# Patient Record
Sex: FEMALE | Race: WHITE | Employment: OTHER | URBAN - NONMETROPOLITAN AREA
[De-identification: names, ages, dates, MRNs, and addresses within clinical notes are randomized per-mention and may not be internally consistent; named-entity substitution may affect disease eponyms.]

---

## 2017-01-05 ENCOUNTER — TELEPHONE (OUTPATIENT)
Dept: OTHER | Age: 72
End: 2017-01-05

## 2017-03-09 LAB — HBA1C MFR BLD: 7.1 %

## 2017-03-13 ENCOUNTER — OFFICE VISIT (OUTPATIENT)
Dept: OTHER | Age: 72
End: 2017-03-13

## 2017-03-13 VITALS
HEIGHT: 66 IN | DIASTOLIC BLOOD PRESSURE: 72 MMHG | SYSTOLIC BLOOD PRESSURE: 132 MMHG | WEIGHT: 192.4 LBS | BODY MASS INDEX: 30.92 KG/M2

## 2017-03-13 DIAGNOSIS — E11.9 TYPE 2 DIABETES MELLITUS WITHOUT COMPLICATION, WITHOUT LONG-TERM CURRENT USE OF INSULIN (HCC): Primary | ICD-10-CM

## 2017-06-19 ENCOUNTER — OFFICE VISIT (OUTPATIENT)
Dept: OTHER | Age: 72
End: 2017-06-19

## 2017-06-19 VITALS
DIASTOLIC BLOOD PRESSURE: 66 MMHG | BODY MASS INDEX: 31.85 KG/M2 | SYSTOLIC BLOOD PRESSURE: 122 MMHG | HEIGHT: 66 IN | WEIGHT: 198.2 LBS

## 2017-06-19 DIAGNOSIS — E11.9 TYPE 2 DIABETES MELLITUS WITHOUT COMPLICATION, WITHOUT LONG-TERM CURRENT USE OF INSULIN (HCC): Primary | ICD-10-CM

## 2017-09-11 LAB
AVERAGE GLUCOSE: NORMAL
BASOPHILS ABSOLUTE: NORMAL /ΜL
BASOPHILS RELATIVE PERCENT: NORMAL %
BUN BLDV-MCNC: 15 MG/DL
CALCIUM SERPL-MCNC: 9.3 MG/DL
CHLORIDE BLD-SCNC: 104 MMOL/L
CHOLESTEROL, TOTAL: 139 MG/DL
CHOLESTEROL/HDL RATIO: NORMAL
CO2: NORMAL MMOL/L
CREAT SERPL-MCNC: 1.29 MG/DL
CREATININE, URINE: 119.2
EOSINOPHILS ABSOLUTE: NORMAL /ΜL
EOSINOPHILS RELATIVE PERCENT: NORMAL %
GFR CALCULATED: 40
GLUCOSE BLD-MCNC: 83 MG/DL
HBA1C MFR BLD: 6.9 %
HCT VFR BLD CALC: 36.6 % (ref 36–46)
HDLC SERPL-MCNC: 66 MG/DL (ref 35–70)
HEMOGLOBIN: 12.4 G/DL (ref 12–16)
LDL CHOLESTEROL CALCULATED: 75 MG/DL (ref 0–160)
LYMPHOCYTES ABSOLUTE: NORMAL /ΜL
LYMPHOCYTES RELATIVE PERCENT: NORMAL %
MCH RBC QN AUTO: NORMAL PG
MCHC RBC AUTO-ENTMCNC: NORMAL G/DL
MCV RBC AUTO: NORMAL FL
MICROALBUMIN/CREAT 24H UR: 2 MG/G{CREAT}
MICROALBUMIN/CREAT UR-RTO: 1.7
MONOCYTES ABSOLUTE: NORMAL /ΜL
MONOCYTES RELATIVE PERCENT: NORMAL %
NEUTROPHILS ABSOLUTE: NORMAL /ΜL
NEUTROPHILS RELATIVE PERCENT: NORMAL %
PLATELET # BLD: 197 K/ΜL
PMV BLD AUTO: NORMAL FL
POTASSIUM SERPL-SCNC: 3.6 MMOL/L
RBC # BLD: NORMAL 10^6/ΜL
SODIUM BLD-SCNC: 139 MMOL/L
TRIGL SERPL-MCNC: 86 MG/DL
VLDLC SERPL CALC-MCNC: NORMAL MG/DL
WBC # BLD: 7.8 10^3/ML

## 2017-10-09 ENCOUNTER — HOSPITAL ENCOUNTER (OUTPATIENT)
Dept: MAMMOGRAPHY | Age: 72
Discharge: HOME | End: 2017-10-09
Payer: MEDICARE

## 2017-10-09 DIAGNOSIS — Z12.39 BREAST CANCER SCREENING: ICD-10-CM

## 2017-10-09 PROCEDURE — G0202 SCR MAMMO BI INCL CAD: HCPCS

## 2017-10-24 ENCOUNTER — HOSPITAL ENCOUNTER (OUTPATIENT)
Dept: PHARMACY | Age: 72
Setting detail: THERAPIES SERIES
Discharge: HOME | End: 2017-10-24
Payer: MEDICARE

## 2017-10-24 VITALS
DIASTOLIC BLOOD PRESSURE: 62 MMHG | BODY MASS INDEX: 30.98 KG/M2 | WEIGHT: 192.8 LBS | HEIGHT: 66 IN | SYSTOLIC BLOOD PRESSURE: 120 MMHG

## 2017-10-24 PROCEDURE — G0108 DIAB MANAGE TRN  PER INDIV: HCPCS | Performed by: REGISTERED NURSE

## 2017-10-24 NOTE — PROGRESS NOTES
Diabetes Clinic at [address] [place], [address]  [phone] (phone)  [phone] (fax)    Patient ID: Brennon Fish 1945  Referring Provider: Dr. Jasmin Dee     Patient's name and  were verified. Subjective:    She presents for Her follow-up diabetic visit. She has type 2 diabetes mellitus. Home regimen includes: sulfonylurea  GLP-1 agonist She is compliant most of the time. Assessment:     Lab Results   Component Value Date    LABA1C 7.1 2017    BUN 15 10/31/2016    CREATININE 1.14 10/31/2016     Vitals:    10/24/17 1009   Weight: 192 lb 12.8 oz (87.5 kg)   Height: 5' 6\" (1.676 m)     Wt Readings from Last 3 Encounters:   10/24/17 192 lb 12.8 oz (87.5 kg)   17 198 lb 3.2 oz (89.9 kg)   17 192 lb 6.4 oz (87.3 kg)     Ht Readings from Last 3 Encounters:   10/24/17 5' 6\" (1.676 m)   17 5' 6\" (1.676 m)   17 5' 6\" (1.676 m)       Current monitoring regimen: home blood tests - 1-2 times daily  Home blood sugar trends: FBS's , 131, 139                                Lunch , 148. Dinner , [id number]  Any episodes of hypoglycemia? no  Previous visit with dietician:   Current diet: B 7-9am cereal   Or banana                       L 12-2p Ihop -2 egg/ 2 duque/ 2 pancakes                                    Cracker barrel- / carrots/ sourdough bread                       D4-6pm salads/ cottage cheese                      Drinks water                       Snacking --bedtime nuts  Current exercise: bike 6 miles 2 times per week; nautilus equip 50 reps. Total =1 hour. Active most days  Eye exam current (within one year): yes Dr. Benjamin Breaux and Dr. Francena Hatchet  2017  --stable.  F/u Dr. Francena Hatchet 6 mos              Last laser more than 2-3 years ago bilat  Any history of foot problems? no  Last foot exam: 10/24/17  Pedal pulses:   peripheral pulses symmetrical              Results of monofilament test: 10/10              Skin noted to be

## 2018-03-19 LAB
AVERAGE GLUCOSE: NORMAL
HBA1C MFR BLD: 7.3 %

## 2018-04-24 ENCOUNTER — OFFICE VISIT (OUTPATIENT)
Dept: INTERNAL MEDICINE CLINIC | Age: 73
End: 2018-04-24
Payer: MEDICARE

## 2018-04-24 VITALS
SYSTOLIC BLOOD PRESSURE: 114 MMHG | DIASTOLIC BLOOD PRESSURE: 64 MMHG | HEIGHT: 66 IN | BODY MASS INDEX: 31.82 KG/M2 | WEIGHT: 198 LBS

## 2018-04-24 DIAGNOSIS — E11.9 TYPE 2 DIABETES MELLITUS WITHOUT COMPLICATION, WITHOUT LONG-TERM CURRENT USE OF INSULIN (HCC): ICD-10-CM

## 2018-04-24 PROCEDURE — G0108 DIAB MANAGE TRN  PER INDIV: HCPCS | Performed by: NURSE PRACTITIONER

## 2018-04-24 PROCEDURE — 99999 PR OFFICE/OUTPT VISIT,PROCEDURE ONLY: CPT | Performed by: NURSE PRACTITIONER

## 2018-04-24 RX ORDER — SAXAGLIPTIN 5 MG/1
5 TABLET, FILM COATED ORAL DAILY
COMMUNITY
Start: 2018-03-19 | End: 2022-04-13 | Stop reason: ALTCHOICE

## 2018-09-10 LAB
BUN BLDV-MCNC: 19 MG/DL
CALCIUM SERPL-MCNC: 9.5 MG/DL
CHLORIDE BLD-SCNC: 105 MMOL/L
CO2: 26 MMOL/L
CREAT SERPL-MCNC: 1.48 MG/DL
GFR CALCULATED: 34
GLUCOSE BLD-MCNC: 137 MG/DL
POTASSIUM SERPL-SCNC: 4.4 MMOL/L
SODIUM BLD-SCNC: 142 MMOL/L

## 2018-10-11 ENCOUNTER — HOSPITAL ENCOUNTER (OUTPATIENT)
Dept: MAMMOGRAPHY | Age: 73
Discharge: HOME | End: 2018-10-11
Payer: MEDICARE

## 2018-10-11 DIAGNOSIS — Z12.39 BREAST CANCER SCREENING: ICD-10-CM

## 2018-10-11 PROCEDURE — 77067 SCR MAMMO BI INCL CAD: CPT

## 2018-11-01 ENCOUNTER — OFFICE VISIT (OUTPATIENT)
Dept: INTERNAL MEDICINE CLINIC | Age: 73
End: 2018-11-01
Payer: MEDICARE

## 2018-11-01 VITALS
WEIGHT: 200.4 LBS | BODY MASS INDEX: 32.21 KG/M2 | SYSTOLIC BLOOD PRESSURE: 102 MMHG | HEIGHT: 66 IN | DIASTOLIC BLOOD PRESSURE: 64 MMHG

## 2018-11-01 DIAGNOSIS — E11.9 TYPE 2 DIABETES MELLITUS WITHOUT COMPLICATION, WITHOUT LONG-TERM CURRENT USE OF INSULIN (HCC): ICD-10-CM

## 2018-11-01 PROCEDURE — G0108 DIAB MANAGE TRN  PER INDIV: HCPCS | Performed by: INTERNAL MEDICINE

## 2018-11-01 RX ORDER — VITAMIN B COMPLEX
2 CAPSULE ORAL DAILY
COMMUNITY

## 2018-12-13 LAB
AVERAGE GLUCOSE: 160
BUN BLDV-MCNC: 15 MG/DL
CALCIUM SERPL-MCNC: NORMAL MG/DL
CHLORIDE BLD-SCNC: 103 MMOL/L
CO2: 27 MMOL/L
CREAT SERPL-MCNC: 1.36 MG/DL
GFR CALCULATED: 38
GLUCOSE BLD-MCNC: 129 MG/DL
HBA1C MFR BLD: 7.2 %
POTASSIUM SERPL-SCNC: 3.9 MMOL/L
SODIUM BLD-SCNC: 138 MMOL/L

## 2019-03-11 ENCOUNTER — OFFICE VISIT (OUTPATIENT)
Dept: INTERNAL MEDICINE CLINIC | Age: 74
End: 2019-03-11
Payer: MEDICARE

## 2019-03-11 VITALS
DIASTOLIC BLOOD PRESSURE: 62 MMHG | BODY MASS INDEX: 32.27 KG/M2 | WEIGHT: 200.8 LBS | SYSTOLIC BLOOD PRESSURE: 130 MMHG | HEIGHT: 66 IN

## 2019-03-11 DIAGNOSIS — E11.9 TYPE 2 DIABETES MELLITUS WITHOUT COMPLICATION, WITHOUT LONG-TERM CURRENT USE OF INSULIN (HCC): ICD-10-CM

## 2019-03-11 PROCEDURE — G0108 DIAB MANAGE TRN  PER INDIV: HCPCS | Performed by: INTERNAL MEDICINE

## 2019-07-22 LAB
A/G RATIO: NORMAL
ALBUMIN SERPL-MCNC: 4 G/DL
ALP BLD-CCNC: 58 U/L
ALT SERPL-CCNC: 14 U/L
AST SERPL-CCNC: 15 U/L
AVERAGE GLUCOSE: 169
BILIRUB SERPL-MCNC: 0.4 MG/DL (ref 0.1–1.4)
BILIRUBIN DIRECT: 0.1 MG/DL
BILIRUBIN, INDIRECT: NORMAL
BUN BLDV-MCNC: 15 MG/DL
CALCIUM SERPL-MCNC: 9.3 MG/DL
CHLORIDE BLD-SCNC: 108 MMOL/L
CHOLESTEROL, TOTAL: 132 MG/DL
CHOLESTEROL/HDL RATIO: 2.3
CO2: 30 MMOL/L
CREAT SERPL-MCNC: 1.53 MG/DL
CREATININE, URINE: 83.3
GFR CALCULATED: 33
GLOBULIN: NORMAL
GLUCOSE BLD-MCNC: 61 MG/DL
HBA1C MFR BLD: 7.5 %
HDLC SERPL-MCNC: 57 MG/DL (ref 35–70)
LDL CHOLESTEROL CALCULATED: NORMAL MG/DL (ref 0–160)
MICROALBUMIN/CREAT 24H UR: 7 MG/G{CREAT}
MICROALBUMIN/CREAT UR-RTO: 8.4
POTASSIUM SERPL-SCNC: 3.9 MMOL/L
PROTEIN TOTAL: 6.9 G/DL
SODIUM BLD-SCNC: 143 MMOL/L
TRIGL SERPL-MCNC: 92 MG/DL
VLDLC SERPL CALC-MCNC: 18 MG/DL

## 2019-10-07 ENCOUNTER — OFFICE VISIT (OUTPATIENT)
Dept: INTERNAL MEDICINE CLINIC | Age: 74
End: 2019-10-07
Payer: MEDICARE

## 2019-10-07 VITALS
WEIGHT: 192.5 LBS | HEART RATE: 78 BPM | DIASTOLIC BLOOD PRESSURE: 73 MMHG | SYSTOLIC BLOOD PRESSURE: 127 MMHG | BODY MASS INDEX: 30.94 KG/M2 | HEIGHT: 66 IN

## 2019-10-07 DIAGNOSIS — E11.9 TYPE 2 DIABETES MELLITUS WITHOUT COMPLICATION, WITHOUT LONG-TERM CURRENT USE OF INSULIN (HCC): ICD-10-CM

## 2019-10-07 PROCEDURE — G0108 DIAB MANAGE TRN  PER INDIV: HCPCS | Performed by: INTERNAL MEDICINE

## 2019-10-28 ENCOUNTER — HOSPITAL ENCOUNTER (OUTPATIENT)
Dept: MAMMOGRAPHY | Age: 74
Discharge: HOME | End: 2019-10-28
Payer: MEDICARE

## 2019-10-28 DIAGNOSIS — Z12.39 BREAST CANCER SCREENING: ICD-10-CM

## 2019-10-28 PROCEDURE — 77063 BREAST TOMOSYNTHESIS BI: CPT

## 2019-10-28 PROCEDURE — 77067 SCR MAMMO BI INCL CAD: CPT

## 2020-04-24 LAB
A/G RATIO: NORMAL
ALBUMIN SERPL-MCNC: 4 G/DL
ALP BLD-CCNC: 55 U/L
ALT SERPL-CCNC: 10 U/L
AST SERPL-CCNC: 12 U/L
AVERAGE GLUCOSE: 137
BILIRUB SERPL-MCNC: 0.4 MG/DL (ref 0.1–1.4)
BILIRUBIN DIRECT: 0.1 MG/DL
BILIRUBIN, INDIRECT: NORMAL
BUN BLDV-MCNC: 14 MG/DL
CALCIUM SERPL-MCNC: 9.5 MG/DL
CHLORIDE BLD-SCNC: 104 MMOL/L
CHOLESTEROL, TOTAL: 116 MG/DL
CHOLESTEROL/HDL RATIO: 2.1
CO2: 30 MMOL/L
CREAT SERPL-MCNC: 1.31 MG/DL
CREATININE, URINE: 373.8
GFR CALCULATED: 39
GLOBULIN: NORMAL
GLUCOSE BLD-MCNC: 65 MG/DL
HBA1C MFR BLD: 6.4 %
HDLC SERPL-MCNC: 55 MG/DL (ref 35–70)
LDL CHOLESTEROL CALCULATED: NORMAL
MICROALBUMIN/CREAT 24H UR: 172.9 MG/G{CREAT}
MICROALBUMIN/CREAT UR-RTO: 46.2
NONHDLC SERPL-MCNC: NORMAL MG/DL
POTASSIUM SERPL-SCNC: 3.5 MMOL/L
PROTEIN TOTAL: 6.7 G/DL
SODIUM BLD-SCNC: 141 MMOL/L
TRIGL SERPL-MCNC: 106 MG/DL
VLDLC SERPL CALC-MCNC: 21 MG/DL

## 2020-07-06 ENCOUNTER — OFFICE VISIT (OUTPATIENT)
Dept: INTERNAL MEDICINE CLINIC | Age: 75
End: 2020-07-06
Payer: MEDICARE

## 2020-07-06 VITALS — HEIGHT: 66 IN | BODY MASS INDEX: 27.45 KG/M2 | WEIGHT: 170.8 LBS | TEMPERATURE: 97.8 F

## 2020-07-06 PROCEDURE — 97802 MEDICAL NUTRITION INDIV IN: CPT | Performed by: DIETITIAN, REGISTERED

## 2020-07-06 NOTE — PROGRESS NOTES
[address] [place] [address]. [address] [place] [address]., [place] [facility], [facility]  [phone] (phone)  [phone] (fax)    Patient Name: Felix Mann. Date of Birth: [id number]. MRN: [id number]      Assessment: Patient is a 66 y.o. female seen for Initial MNT visit for Type 2 DB (pt also with vomiting and swallowing issues). -Nutritionally relevant labs:   Lab Results   Component Value Date/Time    LABA1C 7.5 07/22/2019    LABA1C 7.2 12/13/2018    LABA1C 7.3 03/19/2018    GLUCOSE 61 07/22/2019    GLUCOSE 129 12/13/2018    CHOL 132 07/22/2019    HDL 57 07/22/2019    LDLCALC 75 09/11/2017    TRIG 92 07/22/2019     -Blood sugar trends: Checks FBS. Unable to download meter - voice meter. FBS:  84 - 189, highest reading 215  Denies low BS episodes - she treats with chocolate candy. Pt has difficulty swallowing and has suffered from vomiting since March of this year. She stated she has always had some difficulty with swallowing and episodes of vomiting, but her vomiting has become quite severe since March and attributed to unintentional weight loss of ~ 35 - 40#. Was eating baby food for awhile but now soft foods. Pt likes salads and fresh apple slices but cannot eat this now. Pt following with Brayan Baker CNP for her GI issues. She states she has achalasia. (Internet search - A rare disorder making it difficult for food and liquid to pass into the stomach. Achalasia results from damage to nerves in the food tube (esophagus), preventing the esophagus from squeezing food into the stomach. It may be caused by an abnormal immune system response. Symptoms include a backflow of food in the throat (regurgitation), chest pain, and weight loss). She had a procedure on her esophagus valve and stretched. GI primary care provider may do botox every 3 months,    -Food recall:   Breakfast: yogurt.    Lunch: skips OR no fresh fruit or salads Fazolli - pasta   Tolerates mac and cheese and pasta. \"Cracker Barrel - grilled chicken tenders kids meal - with carrots and with sourdough bread (1-2 x/week.)  Dinner: Navjot Lulu - pasta. Snacks: denies snacking -  except Dried peas (white cheddar)  22 dried peas = 1 serving. Cannot eat nuts and she use to eat them. Other foods tolerated - Soup, Mac and cheese and mashed potatoes (use to like the skin but now cannot eat this part)    -Main Beverages: water, coffee (iced), occ juices. unsw iced tea. No pop - gave up for 2 years ago. Esdras chicken meal - did not tolerate the chicken of the meal.  Likes chips - does not bother her. Last night - wheat thins  Likes sweets - weakness. 4 eldon kisses    Pt travels a lot and eats out and does not cook. She will not heat up food on the stove. She sometimes uses the microwave. She does not eat frozen meals. Pt relies on eating out or going to family and friends houses and eats with them what they make. Goes to [employer] and often takes bus trips. Pt verified that she does the Current exercise: 1 -1.5 hours ; 2 days per week                    6 miles on bike. 40lb arm weights. Leg resistance 40 reps. Arm lifts 10 reps. On days she does not work out she tries to take walks. -Impression of Dietary Intake: all meals are eaten out and convenient microwave or no cooking needed food choices when at home. frequent high fat food choices. Current Outpatient Medications on File Prior to Visit   Medication Sig Dispense Refill    CINNAMON PO Take by mouth      b complex vitamins capsule Take 1 capsule by mouth daily      ONGLYZA 5 MG TABS tablet Take 5 mg by mouth daily      losartan (COZAAR) 25 MG tablet Take by mouth daily       glyBURIDE (DIABETA) 5 MG tablet 5 mg 2 times daily (with meals) 1 tab in am & 1 tab in the evening.  calcium carbonate (OSCAL) 500 MG TABS tablet Take 500 mg by mouth daily. Please verify dosage.       MULTIPLE VITAMIN PO Take 1 tablet by mouth daily.  aspirin 81 MG tablet Take 81 mg by mouth every other day       levothyroxine (SYNTHROID) 25 MCG tablet Take 25 mcg by mouth Daily Take 1 1/2 tablets daily      simvastatin (ZOCOR) 40 MG tablet Take 40 mg by mouth nightly.  omeprazole (PRILOSEC) 20 MG delayed release capsule Take 1 capsule by mouth every morning (before breakfast) 30 capsule 6    Nutritional Supplements (GLUCERNA 1.0 AYDEE/CARBSTEADY) LIQD Take 1 Bottle by mouth 2 times daily 474 mL 6     No current facility-administered medications on file prior to visit. Vitals from current and previous visits:  Temp 97.8 °F (36.6 °C)   Ht 5' 6\" (1.676 m)   Wt 170 lb 12.8 oz (77.5 kg)   BMI 27.57 kg/m²     -Body mass index is 27.57 kg/m². 25-29.9 - Overweight. - Patient lost and 22 pounds over the last . 9  mo  -Weight goal: lose weight. Nutrition Diagnosis:   Food and nutrition-related knowledge deficit & altered GI function related to remote previous MNT/currently undergoing MNT as evidenced by New diagnosis of Achalasia and patient report of not carb counting. Intervention:  -Instructed the patient on: simplified Carbohydrate Counting, Meal Planning for Regular, Balanced Meals & Snacks and The Importance of Regular Physical Activity.  -Handouts given for: simplified carb counting handout x2, basic food logging, 150 gm sample menu, glucerna samples and coupons, soft foods list.    Patient Instructions   1.)  Brk:  Aim for 2 carb servings (=30 gms carbohydrates) + 1 protein   Lunch:  Aim for 3 carb servings (=45 gms carbohydrates) + 2 oz protein + cooked veggies  Dinner:  Aim for 3 carb servings (=45 gms carbohydrates) + 2 oz protein + cooked veggies    2.)  Most of the time 1/2 cup = 1 carb serving or 15 gms carbohydrates    3.)  Drink 1-2 oral supplement drinks/day (ex. Glucerna)    4.)  Bring a 1 week food log along with your meter to every  center appt.         -Nutrition prescription: 1400 calories/day, 135 -

## 2020-07-06 NOTE — PATIENT INSTRUCTIONS
1.)  Brk:  Aim for 2 carb servings (=30 gms carbohydrates) + 1 protein   Lunch:  Aim for 3 carb servings (=45 gms carbohydrates) + 2 oz protein + cooked veggies  Dinner:  Aim for 3 carb servings (=45 gms carbohydrates) + 2 oz protein + cooked veggies    2.)  Most of the time 1/2 cup = 1 carb serving or 15 gms carbohydrates    3.)  Drink 1-2 oral supplement drinks/day (ex. Glucerna)    4.)  Bring a 1 week food log along with your meter to every  center appt.

## 2020-07-24 ENCOUNTER — HOSPITAL ENCOUNTER (OUTPATIENT)
Age: 75
Discharge: HOME | End: 2020-07-24
Payer: MEDICARE

## 2020-07-24 PROCEDURE — U0002 COVID-19 LAB TEST NON-CDC: HCPCS

## 2020-07-26 LAB
PERFORMING LAB: NORMAL
REPORT: NORMAL
SARS-COV-2: NOT DETECTED

## 2020-07-27 ENCOUNTER — ANESTHESIA EVENT (OUTPATIENT)
Dept: ENDOSCOPY | Age: 75
End: 2020-07-27
Payer: MEDICARE

## 2020-07-27 ENCOUNTER — HOSPITAL ENCOUNTER (OUTPATIENT)
Age: 75
Setting detail: OUTPATIENT SURGERY
Discharge: HOME | End: 2020-07-27
Attending: INTERNAL MEDICINE | Admitting: INTERNAL MEDICINE
Payer: MEDICARE

## 2020-07-27 ENCOUNTER — ANESTHESIA (OUTPATIENT)
Dept: ENDOSCOPY | Age: 75
End: 2020-07-27
Payer: MEDICARE

## 2020-07-27 VITALS
HEIGHT: 66 IN | DIASTOLIC BLOOD PRESSURE: 87 MMHG | OXYGEN SATURATION: 100 % | SYSTOLIC BLOOD PRESSURE: 150 MMHG | HEART RATE: 70 BPM | RESPIRATION RATE: 16 BRPM | WEIGHT: 170.4 LBS | TEMPERATURE: 96.8 F | BODY MASS INDEX: 27.38 KG/M2

## 2020-07-27 VITALS
OXYGEN SATURATION: 100 % | DIASTOLIC BLOOD PRESSURE: 77 MMHG | SYSTOLIC BLOOD PRESSURE: 146 MMHG | RESPIRATION RATE: 15 BRPM

## 2020-07-27 PROCEDURE — 3609013800 HC EGD SUBMUCOSAL/BOTOX INJECTION: Performed by: INTERNAL MEDICINE

## 2020-07-27 PROCEDURE — 7100000001 HC PACU RECOVERY - ADDTL 15 MIN: Performed by: INTERNAL MEDICINE

## 2020-07-27 PROCEDURE — 2580000003 HC RX 258: Performed by: INTERNAL MEDICINE

## 2020-07-27 PROCEDURE — 6360000002 HC RX W HCPCS: Performed by: INTERNAL MEDICINE

## 2020-07-27 PROCEDURE — 2709999900 HC NON-CHARGEABLE SUPPLY: Performed by: INTERNAL MEDICINE

## 2020-07-27 PROCEDURE — 3700000000 HC ANESTHESIA ATTENDED CARE: Performed by: INTERNAL MEDICINE

## 2020-07-27 PROCEDURE — 6360000002 HC RX W HCPCS: Performed by: NURSE ANESTHETIST, CERTIFIED REGISTERED

## 2020-07-27 PROCEDURE — 7100000000 HC PACU RECOVERY - FIRST 15 MIN: Performed by: INTERNAL MEDICINE

## 2020-07-27 PROCEDURE — 3700000001 HC ADD 15 MINUTES (ANESTHESIA): Performed by: INTERNAL MEDICINE

## 2020-07-27 PROCEDURE — 2500000003 HC RX 250 WO HCPCS: Performed by: NURSE ANESTHETIST, CERTIFIED REGISTERED

## 2020-07-27 RX ORDER — SODIUM CHLORIDE 0.9 % (FLUSH) 0.9 %
10 SYRINGE (ML) INJECTION EVERY 12 HOURS SCHEDULED
Status: DISCONTINUED | OUTPATIENT
Start: 2020-07-27 | End: 2020-07-27 | Stop reason: HOSPADM

## 2020-07-27 RX ORDER — PROPOFOL 10 MG/ML
INJECTION, EMULSION INTRAVENOUS PRN
Status: DISCONTINUED | OUTPATIENT
Start: 2020-07-27 | End: 2020-07-27 | Stop reason: SDUPTHER

## 2020-07-27 RX ORDER — SODIUM CHLORIDE 450 MG/100ML
INJECTION, SOLUTION INTRAVENOUS CONTINUOUS
Status: DISCONTINUED | OUTPATIENT
Start: 2020-07-27 | End: 2020-07-27 | Stop reason: HOSPADM

## 2020-07-27 RX ORDER — SODIUM CHLORIDE 0.9 % (FLUSH) 0.9 %
10 SYRINGE (ML) INJECTION PRN
Status: DISCONTINUED | OUTPATIENT
Start: 2020-07-27 | End: 2020-07-27 | Stop reason: HOSPADM

## 2020-07-27 RX ORDER — LIDOCAINE HYDROCHLORIDE 20 MG/ML
INJECTION, SOLUTION EPIDURAL; INFILTRATION; INTRACAUDAL; PERINEURAL PRN
Status: DISCONTINUED | OUTPATIENT
Start: 2020-07-27 | End: 2020-07-27 | Stop reason: SDUPTHER

## 2020-07-27 RX ADMIN — PROPOFOL 50 MG: 10 INJECTION, EMULSION INTRAVENOUS at 09:31

## 2020-07-27 RX ADMIN — ONABOTULINUMTOXINA 100 UNITS: 100 INJECTION, POWDER, LYOPHILIZED, FOR SOLUTION INTRADERMAL; INTRAMUSCULAR at 09:41

## 2020-07-27 RX ADMIN — LIDOCAINE HYDROCHLORIDE 80 MG: 20 INJECTION, SOLUTION EPIDURAL; INFILTRATION; INTRACAUDAL; PERINEURAL at 09:30

## 2020-07-27 RX ADMIN — SODIUM CHLORIDE: 4.5 INJECTION, SOLUTION INTRAVENOUS at 09:15

## 2020-07-27 ASSESSMENT — PAIN - FUNCTIONAL ASSESSMENT: PAIN_FUNCTIONAL_ASSESSMENT: 0-10

## 2020-07-27 ASSESSMENT — PAIN SCALES - GENERAL
PAINLEVEL_OUTOF10: 0
PAINLEVEL_OUTOF10: 0

## 2020-07-27 NOTE — ANESTHESIA PRE PROCEDURE
Department of Anesthesiology  Preprocedure Note       Name:  Georgette Luis   Age:  66 y.o.  :  1945                                          MRN:  [id number]         Date:  2020      Surgeon: Mauricio Wilkes):  Zev Lofton MD    Procedure: Procedure(s):  EGD ESOPHAGOGASTRODUODENOSCOPY DILATATION    Medications prior to admission:   Prior to Admission medications    Medication Sig Start Date End Date Taking? Authorizing Provider   CINNAMON PO Take by mouth   Yes Historical Provider, MD   ONGLYZA 5 MG TABS tablet Take 5 mg by mouth daily 3/19/18  Yes Historical Provider, MD   losartan (COZAAR) 25 MG tablet Take by mouth daily  16  Yes Historical Provider, MD   glyBURIDE (DIABETA) 5 MG tablet 5 mg 2 times daily (with meals) 1 tab in am & 1 tab in the evening. 16  Yes Historical Provider, MD   calcium carbonate (OSCAL) 500 MG TABS tablet Take 500 mg by mouth daily. Please verify dosage. Yes Historical Provider, MD   MULTIPLE VITAMIN PO Take 1 tablet by mouth daily. Yes Historical Provider, MD   aspirin 81 MG tablet Take 81 mg by mouth every other day    Yes Historical Provider, MD   levothyroxine (SYNTHROID) 25 MCG tablet Take 25 mcg by mouth Daily Take 1 1/2 tablets daily   Yes Historical Provider, MD   simvastatin (ZOCOR) 40 MG tablet Take 40 mg by mouth nightly.    Yes Historical Provider, MD   omeprazole (PRILOSEC) 20 MG delayed release capsule Take 1 capsule by mouth every morning (before breakfast) 6/3/20 7/15/20  PIPER Buckley CNP   Nutritional Supplements (GLUCERNA 1.0 AYDEE/CARBSTEADY) LIQD Take 1 Bottle by mouth 2 times daily 6/3/20 7/15/20  PIPER Buckley CNP   b complex vitamins capsule Take 1 capsule by mouth daily    Historical Provider, MD       Current medications:    Current Facility-Administered Medications   Medication Dose Route Frequency Provider Last Rate Last Dose    sodium chloride flush 0.9 % injection 10 mL  10 mL Intravenous 2 times per day Zev Lofton MD 07/15/20 171 lb 12.8 oz (77.9 kg)   07/06/20 170 lb 12.8 oz (77.5 kg)     Body mass index is 27.5 kg/m². CBC:   Lab Results   Component Value Date    WBC 6.2 07/09/2020    RBC 3.67 07/09/2020    HGB 11.3 07/09/2020    HCT 33.7 07/09/2020    MCV 91.7 07/09/2020    RDW 13.7 07/09/2020     07/09/2020       CMP:   Lab Results   Component Value Date     07/09/2020    K 4.0 07/09/2020     07/09/2020    CO2 30 07/09/2020    BUN 15 07/09/2020    CREATININE 1.22 07/09/2020    AGRATIO 1.2 07/09/2020    LABGLOM 33 07/22/2019    GLUCOSE 61 07/09/2020    PROT 6.7 07/09/2020    PROT 6.9 07/22/2019    CALCIUM 9.50 07/09/2020    BILITOT 0.4 07/09/2020    ALKPHOS 54 07/09/2020    AST 12 07/09/2020    ALT 9 07/09/2020       POC Tests: No results for input(s): POCGLU, POCNA, POCK, POCCL, POCBUN, POCHEMO, POCHCT in the last 72 hours. Coags: No results found for: PROTIME, INR, APTT    HCG (If Applicable): No results found for: PREGTESTUR, PREGSERUM, HCG, HCGQUANT     ABGs: No results found for: PHART, PO2ART, FYW0RSN, GBS5JES, BEART, T2BATOEW     Type & Screen (If Applicable):  No results found for: LABABO, LABRH    Drug/Infectious Status (If Applicable):  No results found for: HIV, HEPCAB    COVID-19 Screening (If Applicable):   Lab Results   Component Value Date    COVID19 NOT DETECTED 07/24/2020         Anesthesia Evaluation  Patient summary reviewed and Nursing notes reviewed  Airway: Mallampati: III     Neck ROM: full   Dental: normal exam         Pulmonary:Negative Pulmonary ROS and normal exam  breath sounds clear to auscultation                             Cardiovascular:    (+) hypertension:,         Rhythm: regular  Rate: normal                    Neuro/Psych:   Negative Neuro/Psych ROS              GI/Hepatic/Renal:   (+) GERD:,           Endo/Other:    (+) DiabetesType II DM, , hypothyroidism::., .                 Abdominal:   (+) obese,     Abdomen: soft.     Vascular: negative vascular ROS.                                       Anesthesia Plan      MAC     ASA 3       Induction: intravenous. Anesthetic plan and risks discussed with patient. Plan discussed with CRNA and attending.                 PIPER Medina - CRNA   7/27/2020

## 2020-07-27 NOTE — ANESTHESIA POSTPROCEDURE EVALUATION
Department of Anesthesiology  Postprocedure Note    Patient: Diane Huerta  MRN: [id number]  YOB: 1945  Date of evaluation: 7/27/2020  Time:  10:05 AM     Procedure Summary     Date:  07/27/20 Room / Location:  [address] [place] [address] / [address] [place] [address]    Anesthesia Start:  5239 Anesthesia Stop:  7128    Procedure:  EGD SUBMUCOSAL/BOTOX INJECTION (Left Esophagus) Diagnosis:  (ACHALASIA OF ESOPHAGUS, INTERMITTENT DYSPHAGIA, POSTPRANDIAL VOMITING)    Surgeon:  Byron Day MD Responsible Provider:  Andre Pierre MD    Anesthesia Type:  MAC ASA Status:  3          Anesthesia Type: MAC    Deven Phase I: Deven Score: 10    Deven Phase II: Deven Score: 10    Last vitals: Reviewed and per EMR flowsheets.        Anesthesia Post Evaluation    Patient location during evaluation: PACU  Patient participation: complete - patient participated  Pain score: 0  Airway patency: patent  Nausea & Vomiting: no nausea and no vomiting  Complications: no  Cardiovascular status: blood pressure returned to baseline  Respiratory status: acceptable  Hydration status: euvolemic

## 2020-07-27 NOTE — PROGRESS NOTES
Photos taken, scope used GIF  SER#[id number].  100 Units of Botox injected into gastric sites. EGD completed, Zuhair tolerated well.

## 2020-07-27 NOTE — PROGRESS NOTES
5849:  Patient to recovery post procedure. No family at bedside. 6069:  Patient tolerating oral fluids. 1008:  Dr. Arlette Dotson in to discuss results/findings, plan of care with patient. 1012:  Discharge instructions provided to patient, verbalized understanding. IV discontinued. 1017:  Patient's friend called and informed patient ready for discharge. 1025:  Discharged via wheelchair to private vehicle to be driven home by family member.

## 2020-07-27 NOTE — H&P
[address] [address]  Sedation/Analgesia History & Physical    Patient: Vidhya Garcia : 1945  Med Rec#: [id number] Acc#: [id number]   Provider Performing Procedure: Frankie Pate  Primary Care Physician: Anjum Hu MD    PRE-PROCEDURE   Full CODE [x]Yes  DNR-CCA/DNR-CC []Yes   Brief History/Pre-Procedure Diagnosis: dysphagia, achalasia           MEDICAL HISTORY  []CAD/Valve  []Liver Disease  []Lung Disease []Diabetes  []Hypertension []Renal Disease  []Additional information:       has a past medical history of Hyperlipidemia, Hypertension, Hypothyroidism, and Type II or unspecified type diabetes mellitus without mention of complication, not stated as uncontrolled. SURGICAL HISTORY   has a past surgical history that includes Hysterectomy; Upper gastrointestinal endoscopy; and Colonoscopy. Additional information:       ALLERGIES   Allergies as of 07/15/2020 - Review Complete 07/15/2020   Allergen Reaction Noted    Sulfa antibiotics  2015     Additional information:       MEDICATIONS   Coumadin Use Last 7 Days [x]No []Yes  Antiplatelet drug therapy use last 7 days  [x]No []Yes  Other anticoagulant use last 7 days  [x]No []Yes    Current Facility-Administered Medications:     sodium chloride flush 0.9 % injection 10 mL, 10 mL, Intravenous, 2 times per day, [place]ie Pate MD    sodium chloride flush 0.9 % injection 10 mL, 10 mL, Intravenous, PRN, Frankie Pate MD    0.45 % sodium chloride infusion, , Intravenous, Continuous, [place]ie Pate MD    onabotulinumtoxin A (BOTOX) injection 100 Units, 100 Units, Intramuscular, Once, Frankie Pate MD  Prior to Admission medications    Medication Sig Start Date End Date Taking?  Authorizing Provider   CINNAMON PO Take by mouth   Yes Historical Provider, MD   ONGLYZA 5 MG TABS tablet Take 5 mg by mouth daily 3/19/18  Yes Historical Provider, MD   losartan (COZAAR) 25 MG tablet Take by mouth daily  16  Yes Historical Provider, MD glyBURIDE (DIABETA) 5 MG tablet 5 mg 2 times daily (with meals) 1 tab in am & 1 tab in the evening. 1/19/16  Yes Historical Provider, MD   calcium carbonate (OSCAL) 500 MG TABS tablet Take 500 mg by mouth daily. Please verify dosage. Yes Historical Provider, MD   MULTIPLE VITAMIN PO Take 1 tablet by mouth daily. Yes Historical Provider, MD   aspirin 81 MG tablet Take 81 mg by mouth every other day    Yes Historical Provider, MD   levothyroxine (SYNTHROID) 25 MCG tablet Take 25 mcg by mouth Daily Take 1 1/2 tablets daily   Yes Historical Provider, MD   simvastatin (ZOCOR) 40 MG tablet Take 40 mg by mouth nightly. Yes Historical Provider, MD   omeprazole (PRILOSEC) 20 MG delayed release capsule Take 1 capsule by mouth every morning (before breakfast) 6/3/20 7/15/20  PIPER Rios CNP   Nutritional Supplements (GLUCERNA 1.0 AYDEE/CARBSTEADY) LIQD Take 1 Bottle by mouth 2 times daily 6/3/20 7/15/20  PIPER Rios CNP   b complex vitamins capsule Take 1 capsule by mouth daily    Historical Provider, MD     Additional information:       PHYSICAL:   Heart:  [x]Regular rate and rhythm  []Other:    Lungs:  [x]Clear    []Other:    Abdomen: [x]Soft    []Other:    Mental Status: [x]Alert & Oriented  []Other:      VITAL SIGNS   Patient Vitals for the past 24 hrs:   BP Temp Temp src Pulse Resp SpO2 Height Weight   07/27/20 0741 139/67 97.1 °F (36.2 °C) Temporal 73 16 98 % 5' 6\" (1.676 m) 170 lb 6.4 oz (77.3 kg)       PLANNED PROCEDURE   [x]EGD  []Colonoscopy []Flex Sigmoid  []ERCP []EUS   []Cystoscopy  [] CATH [] BRONCH   Consent: I have discussed with the patient and/or the patient representative the indication, alternatives, and the possible risks and/or complications of the planned procedure and the anesthesia methods. The patient and/or patient representative appear to understand and agree to proceed.   SEDATION  Planned agent:[x]Midazolam []Meperidine [x]Sublimaze []Morphine  []Diazepam []Other:     ASA Classification: Class 3 - A patient with severe systemic disease that limits activity but is not incapacitating    Airway Assessment: Mallampati Class II - (soft palate, fauces & uvula are visible)    Monitoring and Safety: The patient will be placed on a cardiac monitor and vital signs, pulse oximetry and level of consciousness will be continuously evaluated throughout the procedure. The patient will be closely monitored until recovery from the medications is complete and the patient has returned to baseline status. Respiratory therapy will be on standby during the procedure. [x]Pre-procedure diagnostic studies complete and results available. Comment:    [x]Previous sedation/anesthesia experiences assessed. Comment:    [x]The patient is an appropriate candidate to undergo the planned procedure sedation and anesthesia. (Refer to nursing sedation/analgesia documentation record)  [x]Formulation and discussion of sedation/procedure plan, risks, and expectations with patient and/or responsible adult completed. [x]Patient examined immediately prior to the procedure.  (Refer to nursing sedation/analgesia documentation record)    Zev Lofton MD   Electronically signed 7/27/2020 at 9:29 AM

## 2020-07-28 NOTE — OP NOTE
[address] [place] [address]                                OPERATIVE REPORT    PATIENT NAME: Judie Frazier                     :        1945  MED REC NO:   [id number]                           ROOM:  ACCOUNT NO:   [de-identified]                           ADMIT DATE: 2020  PROVIDER:     Bay Ernst M.D.    DATE OF PROCEDURE:  2020    INDICATIONS:  The patient with history of dysphagia and history of  achalasia, benefitted from upper endoscopy and dilation done recently;  however, she has had significant difficulty to swallow. Plan today for  EGD to evaluate with botulinum toxin injection at the distal esophagus  to treat achalasia. SURGEON:  Bay Ernst MD    ASA CLASSIFICATION:  III. Estimated blood loss in none. DESCRIPTION OF PROCEDURE:  The patient was brought to GI lab. Consent  was obtained. Risks involved with the procedure were explained to the  patient. Informed consent was obtained. The patient was monitored  during the procedure with pulse oximetry, blood pressure monitoring, and  oxygen by nasal cannula. Sedation by incremental doses of IV propofol  given by anesthesia service to achieve total IV anesthesia. For ASA  classification and medication given during the procedure, please see  Anesthesia note. ESTIMATED BLOOD LOSS:  None. Standard video upper scope GIF- Olympus advanced under direct  vision from the oral cavity up to the duodenum. Esophagus featured acid  reflux with small food seen in the distal esophagus not very hard. I was  able to suction them and clean them. Then, I had slight difficulty to  continue into the stomach, likely due to spasticity of the distal  esophagus typical of achalasia. Scope was advanced to the stomach. Retroflex examination of the cardia revealed normal cardia.   Gastric  mucosa of fundus and body appeared normal.  Antrum appeared normal. Pylorus appeared normal.  Few nonclinically important gastric polyps  seen. No polypectomy was performed. Then, we diluted 100 units of  botulinum toxin in 10 mL of sterile water. Then, injected at distal  esophagus in 10 locations using injection needle. Then, the scope was  advanced again. Inspected the site of insertion or injection. No  bleeding, no complication seen. The procedure was terminated with no  immediate complications. IMPRESSION:  1. Dysphagia due to achalasia with successful injection of 100 units of  botulinum toxin at the distal esophagus. 2.  Gastritis as well as a few nonclinically significant gastric polyps. PLAN:  Follow up with the GI clinic for evaluation. Repeat injection if  the patient does not clinically improve or repeat dilation if clinically  not improved, depends on the clinical setting. The patient is to follow  up with GI clinic for evaluation, soft diet has been recommended.         Paxton Burris M.D.    D: 07/27/2020 10:03:05       T: 07/27/2020 10:50:38     [email or web address]  Job#: [id number]     Doc#: [id number]    CC:  Catrina Cade M.D.

## 2020-09-14 LAB
AVERAGE GLUCOSE: NORMAL
HBA1C MFR BLD: 6.5 %

## 2020-10-22 ENCOUNTER — OFFICE VISIT (OUTPATIENT)
Dept: INTERNAL MEDICINE CLINIC | Age: 75
End: 2020-10-22
Payer: MEDICARE

## 2020-10-22 VITALS — WEIGHT: 192.8 LBS | HEIGHT: 66 IN | BODY MASS INDEX: 30.98 KG/M2 | TEMPERATURE: 98.2 F

## 2020-10-22 PROCEDURE — 97803 MED NUTRITION INDIV SUBSEQ: CPT | Performed by: DIETITIAN, REGISTERED

## 2020-10-22 NOTE — PATIENT INSTRUCTIONS
1. )  So Glad to hear you can eat better and no vomiting.  - Aim for no more than 4 carb servings at a meal  = Remember:  1/2 cup = 1 carb servings. 2.)  Check the label of your yogurt  Choose the yogurts with 120 calories or less. 3.)  Drink more water - 2-3 bottles a day    4.)  Check BS at other times of the day  - Before lunch or before supper, BS goal   - 2 hours after the start of a meal, BS goal:  100 - 150/160    5.)  Refer to the snack handout for lower calorie snack ideas. Bring your tablet with your Blood sugars written down to every DB center appt. Along with your meter.  Thanks

## 2020-10-22 NOTE — PROGRESS NOTES
[address] [place] [address]. [id number] [place] [address] Horace., [facility], [address]  [phone] (phone)  [phone] (fax)    Patient Name: India Bob. Date of Birth: [id number]. MRN: [id number]      Assessment: Patient is a 66 y.o. female seen for follow-up MNT visit for Type 2 DB.     -Nutritionally relevant labs:   Lab Results   Component Value Date/Time    LABA1C 7.5 07/22/2019    LABA1C 7.2 12/13/2018    LABA1C 7.3 03/19/2018    GLUCOSE 61 (L) 07/09/2020 09:13 AM    GLUCOSE 61 07/22/2019    GLUCOSE 129 12/13/2018    CHOL 132 07/22/2019    HDL 57 07/22/2019    LDLCALC 75 09/11/2017    TRIG 92 07/22/2019     Stopped Metformin by Dr Soraya Monaco several months ago. Per pt Had a AIC done ~6.5%    Prior nut. Counseling session 7/6/2020  Pt has difficulty swallowing and has suffered from vomiting since March of this year. She stated she has always had some difficulty with swallowing and episodes of vomiting, but her vomiting has become quite severe since March and attributed to unintentional weight loss of ~ 35 - 40#. Was eating baby food for awhile but now soft foods. Pt received botox treatment for her achalasia and is able to tolerate regular foods again. Pt gained weight back that she had lost.   -Blood sugar trends: Unable to retrieve BS readings from meter. Meter does not download. Per pt - FBS 64 today and then drank Ensure, baby food with quinoa with pumpkin. FBS yesterday 89  Most of the time FBS <130  Denies low episodes. Pt states she writes down her BS readings everyday.    -Food recall/food log:   Breakfast: 1 1/2 cup milk and pop tart OR 1/2 blueberry muffin & milk OR yogurt and Oral nutrition supplement (ONS) drink. OR oatmeal and coffee with cream and sometimes with peach flavored ONS.    Snack:  Peanut bar OR granola bar and iced coffee OR sf cookie wafers OR 15-20 wheat thins  Lunch: chopped sirloin, 1/4 baked potato, dinner roll and butter OR 3 grilled chicken tenders, 2 grilled sourdough bread, sweet potato casserole from [employer] OR 1 1/2 cups mac & cheese and Jello with fruit (ate at her cousin's house). Snack:  Apple and caramel OR ice cream OR diced pears OR brownie  Dinner: Tomato Basil soup and 1/2 grilled cheese OR 2 eggs, duque, potatoes, onions, cheese, 2 sl rye toast.   Snacks: corn chips or Doritos or ice cream  Pt dislikes light yogurts    -Main Beverages: Unsw iced tea, coffee. Water - 1 bottled    Pt travels a lot and eats out and does not cook. Shakira Antunez will not heat up food on the stove. She sometimes uses the microwave. She does not eat frozen meals. Pt relies on eating out or going to family and friends houses and eats with them what they make. Goes to casinos and often takes bus trips.      -Impression of Dietary Intake: on average, 3 meals per day, 2-3 snacks/day, frequent sweets intake. Current Outpatient Medications on File Prior to Visit   Medication Sig Dispense Refill    CINNAMON PO Take by mouth      b complex vitamins capsule Take 1 capsule by mouth daily      ONGLYZA 5 MG TABS tablet Take 5 mg by mouth daily      losartan (COZAAR) 25 MG tablet Take by mouth daily       glyBURIDE (DIABETA) 5 MG tablet 5 mg 2 times daily (with meals) 1 tab in am & 1 tab in the evening.  calcium carbonate (OSCAL) 500 MG TABS tablet Take 500 mg by mouth daily. Please verify dosage.  MULTIPLE VITAMIN PO Take 1 tablet by mouth daily.  levothyroxine (SYNTHROID) 25 MCG tablet Take 25 mcg by mouth Daily Take 1 1/2 tablets daily      simvastatin (ZOCOR) 40 MG tablet Take 40 mg by mouth nightly.       omeprazole (PRILOSEC) 20 MG delayed release capsule Take 1 capsule by mouth every morning (before breakfast) 90 capsule 2    Nutritional Supplements (GLUCERNA 1.0 AYDEE/CARBSTEADY) LIQD Take 1 Bottle by mouth 2 times daily 474 mL 6    aspirin 81 MG tablet Take 81 mg by mouth every other day        No current facility-administered medications on file prior to visit. Vitals from current and previous visits:  Temp 98.2 °F (36.8 °C)   Ht 5' 6\" (1.676 m)   Wt 192 lb 12.8 oz (87.5 kg)   BMI 31.12 kg/m²     -Body mass index is 31.12 kg/m². 30-34.9 - Obesity Grade I.   - Patient gained and 22 pounds over the last 3.5 mo. .  -Weight goal: lose weight. Nutrition Diagnosis:   Food and nutrition-related knowledge deficit related to currently undergoing MNT as evidenced by her food logs and Patient report of not understanding carb counting. Intervention:  -Impression: Pt is not experiencing vomiting d/t achalasia anymore since Botox. She has been eating sweets frequently and meals are sometimes too high in carbohydrates. Pt states she does not understand carb counting. Demonstrated what foods are carbohydrates using food models and demonstrated what 4 carb servings/meal would look like along with having protein and veggies    -Instructed the patient on: see instructions below. Gave glucerna and Boost glucose control samples and coupons to patient. Patient Instructions   1.)  So Glad to hear you can eat better and no vomiting.  - Aim for no more than 4 carb servings at a meal  = Remember:  1/2 cup = 1 carb servings. 2.)  Check the label of your yogurt  Choose the yogurts with 120 calories or less. 3.)  Drink more water - 2-3 bottles a day    4.)  Check BS at other times of the day  - Before lunch or before supper, BS goal   - 2 hours after the start of a meal, BS goal:  100 - 150/160    5.)  Refer to the snack handout for lower calorie snack ideas. Bring your tablet with your Blood sugars written down to every DB center appt. Along with your meter. Thanks      -Nutrition prescription: 1500 calories/day, 165 g carbs/day. Comprehension verified using teachback method.     Monitoring/Evaluation:   -Followup visit: 5 mo with dietitian.   -Receptiveness to education/goals: Agreeable.  -Evaluation of education:

## 2020-11-03 ENCOUNTER — HOSPITAL ENCOUNTER (OUTPATIENT)
Dept: MAMMOGRAPHY | Age: 75
Discharge: HOME | End: 2020-11-03
Payer: MEDICARE

## 2020-11-03 ENCOUNTER — OFFICE VISIT (OUTPATIENT)
Dept: INTERNAL MEDICINE CLINIC | Age: 75
End: 2020-11-03
Payer: MEDICARE

## 2020-11-03 VITALS
HEART RATE: 78 BPM | DIASTOLIC BLOOD PRESSURE: 64 MMHG | SYSTOLIC BLOOD PRESSURE: 120 MMHG | BODY MASS INDEX: 31.37 KG/M2 | TEMPERATURE: 97.3 F | HEIGHT: 66 IN | WEIGHT: 195.2 LBS

## 2020-11-03 PROCEDURE — 77063 BREAST TOMOSYNTHESIS BI: CPT

## 2020-11-03 PROCEDURE — G0108 DIAB MANAGE TRN  PER INDIV: HCPCS | Performed by: INTERNAL MEDICINE

## 2020-11-03 NOTE — PATIENT INSTRUCTIONS
Continue with [employer] workout routine 2-3 days per week           This keeps you moving and mobile? ?   Keep checking blood sugars once daily                --try to get your reading before supper or bedtime 2 days per week                   Goal   Remember to keep your carbohydrates at your meals and snacks consistent            too many carbs at a meal will make your blood sugars too high             Not enough carbs at your meal may result in a low blood sugar before the next time you eat

## 2021-03-04 ENCOUNTER — TELEPHONE (OUTPATIENT)
Dept: INTERNAL MEDICINE CLINIC | Age: 76
End: 2021-03-04

## 2021-03-04 NOTE — TELEPHONE ENCOUNTER
Left jennifer from outpatient dietitian office of needing to cancel her upcoming dietitian appt on Monday 3/22/21 @ 1130 am.  Callback # provided to reschedule this appt.

## 2021-03-11 ENCOUNTER — OFFICE VISIT (OUTPATIENT)
Dept: INTERNAL MEDICINE CLINIC | Age: 76
End: 2021-03-11
Payer: MEDICARE

## 2021-03-11 VITALS — HEIGHT: 66 IN | WEIGHT: 199.6 LBS | TEMPERATURE: 98 F | BODY MASS INDEX: 32.08 KG/M2

## 2021-03-11 DIAGNOSIS — E11.9 TYPE 2 DIABETES MELLITUS WITHOUT COMPLICATION, WITHOUT LONG-TERM CURRENT USE OF INSULIN (HCC): ICD-10-CM

## 2021-03-11 PROCEDURE — 97803 MED NUTRITION INDIV SUBSEQ: CPT | Performed by: DIETITIAN, REGISTERED

## 2021-03-11 NOTE — PROGRESS NOTES
[address] [place] [address]. [address] [place] [address] Horace., [facility], [facility]  [phone] (phone)  [phone] (fax)    Patient Name: Mahsa Zaman. Date of Birth: [id number]. MRN: [id number]      Assessment: Patient is a 66 y.o. female seen for follow-up MNT visit for Type 2 DB.     -Nutritionally relevant labs:   Lab Results   Component Value Date/Time    LABA1C 7.5 07/22/2019    LABA1C 7.2 12/13/2018    LABA1C 7.3 03/19/2018    GLUCOSE 61 (L) 07/09/2020 09:13 AM    GLUCOSE 61 07/22/2019    GLUCOSE 129 12/13/2018    CHOL 132 07/22/2019    HDL 57 07/22/2019    LDLCALC 75 09/11/2017    TRIG 92 07/22/2019     3/11/2021 called Dr Bruna Mcarthur office for most current office visit, [address] [place] [address] and labs. -Blood sugar trends: Only checks FBS. Written documentation provided,  Did not bring meter. Meter does not download. Ranges in the past month: 81 - 140    -Food recall/Food log:   Breakfast: Instant flavored oatmeal & 1 cup milk OR protein drink and granola bar 1 yogurt and milk OR Mush, fried egg, duque OR omelette hashbrown, toast and jelly. Lunch: [place] spread and potato salad OR orange chicken and rice OR Mac and cheese (chooses this entree bec it is cheapest.), duque and tomato juice OR taco supreme OR 3 grilled chicken tenders and carrots. Snack - boiled egg OR 10 pringles OR cheese balls OR orange  Dinner: Yogurt and protein drink OR Homemade beef stew OR chicken potatoes and carrots OR small piece of pizza OR spaghetti and meatballs, applesauce. Snacks: Gardettos OR quinoa chips OR walnuts OR 1/2 cup ice cream    Exercises - Goes to [employer] 2x/week    -Main Beverages: water or unsw tea. Sugar free flavored iced coffee. -Impression of Dietary Intake: on average, 3 meals per day, high fat/ cholesterol, eating out every day, pt does not cook. .    Current Outpatient Medications on File Prior to Visit   Medication Sig Dispense Refill    Nutritional Supplements (BOOST/FIBER PO) Take 1 Bottle by mouth as needed For meal replacement      CINNAMON PO Take by mouth      b complex vitamins capsule Take 1 capsule by mouth daily      ONGLYZA 5 MG TABS tablet Take 5 mg by mouth daily      losartan (COZAAR) 25 MG tablet Take by mouth daily       glyBURIDE (DIABETA) 5 MG tablet 5 mg 2 times daily (with meals) 1 tab in am & 1 tab in the evening.  calcium carbonate (OSCAL) 500 MG TABS tablet Take 500 mg by mouth daily. Please verify dosage.  MULTIPLE VITAMIN PO Take 1 tablet by mouth daily.  aspirin 81 MG tablet Take 81 mg by mouth every other day       levothyroxine (SYNTHROID) 25 MCG tablet Take 25 mcg by mouth Daily Take 1 1/2 tablets daily      simvastatin (ZOCOR) 40 MG tablet Take 40 mg by mouth nightly.  omeprazole (PRILOSEC) 20 MG delayed release capsule Take 1 capsule by mouth every morning (before breakfast) 90 capsule 2    Nutritional Supplements (GLUCERNA 1.0 AYDEE/CARBSTEADY) LIQD Take 1 Bottle by mouth 2 times daily 474 mL 6     No current facility-administered medications on file prior to visit. Vitals from current and previous visits:  Temp 98 °F (36.7 °C)   Ht 5' 6\" (1.676 m)   Wt 199 lb 9.6 oz (90.5 kg)   BMI 32.22 kg/m²     -Body mass index is 32.22 kg/m². 30-34.9 - Obesity Grade I.   - Patient gained and 6 pounds over the last 5 mo. .  -Weight goal: lose weight. Nutrition Diagnosis:   Self-monitoring deficit and food and nutrition knowledge deficit related to patient unwilling to bring meter with her when away from home and currently undergoing MNT as evidenced by Food recall and BS documentation of only checking BS in the morning.          Intervention:  -Impression: Pt relies heavily on eating out or going family/friend's homes for meals.    -Instructed the patient on: Meal Planning for Regular, Balanced Meals & Snacks and The Importance of Regular Physical Activity and see instructions below    -Handouts given for: Boost Glucose Control and Glucerna coupons, eat seafood twice a week, choosing water, food bank info (for her family members), roasting veggies. Patient Instructions   1.)  Next time buy lower sugar instant oatmeal.    2.)  Good to select grilled or baked items when eating out. - Limit or avoid high fat condiments, such as, sour cream, dressings.  - Add cooked vegetables with meals when available. 3.)  Eat seafood or salmon 1-2x/week. 4.)  When eating Chinese - limit rice and sweet and sour sauces or any sauces with added sugars.    - Good idea to limit pasta, rice and bread. Small portions are ok.    5.)  Check BS 1-2 x/week after a meal - check 2 hours after the start of a meal.  BS goal:  100 - 150/160    6.)  Good job going to [employer] each week go for sure 2x/week. Also get up from sitting too long and take walks. -Nutrition prescription: 1400 calories/day, 150 g carbs/day. Comprehension verified using teachback method. Monitoring/Evaluation:   -Followup visit: 9 mo with dietitian.   -Receptiveness to education/goals: Agreeable.  -Evaluation of education: Indicates understanding.  -Readiness to change: precontemplative- checking BS in the evening when home after supper, adding more vegetables at meals, limiting high carb entree choices when eating out. -Expected compliance: fair. Thank you for your referral of this patient. Total time involved in direct patient education: 30 minutes for follow-up MNT visit.

## 2021-09-13 LAB
ALBUMIN SERPL-MCNC: 4.2 G/DL
ALP BLD-CCNC: 74 U/L
ALT SERPL-CCNC: 74 U/L
ANION GAP SERPL CALCULATED.3IONS-SCNC: 10 MMOL/L
AST SERPL-CCNC: 19 U/L
AVERAGE GLUCOSE: 209
BASOPHILS ABSOLUTE: 0 /ΜL
BASOPHILS RELATIVE PERCENT: 0.7 %
BILIRUB SERPL-MCNC: 0.4 MG/DL (ref 0.1–1.4)
BUN BLDV-MCNC: 19 MG/DL
CALCIUM SERPL-MCNC: 9.6 MG/DL
CHLORIDE BLD-SCNC: 104 MMOL/L
CHOLESTEROL, TOTAL: 153 MG/DL
CHOLESTEROL/HDL RATIO: 2.5
CO2: 29 MMOL/L
CREAT SERPL-MCNC: 1.4 MG/DL
CREATININE, URINE: 79.4
EOSINOPHILS ABSOLUTE: 200 /ΜL
EOSINOPHILS RELATIVE PERCENT: 2.4 %
GFR CALCULATED: 36
GLUCOSE BLD-MCNC: 134 MG/DL
HBA1C MFR BLD: 8.9 %
HCT VFR BLD CALC: 35.2 % (ref 36–46)
HDLC SERPL-MCNC: 60 MG/DL (ref 35–70)
HEMOGLOBIN: 11.9 G/DL (ref 12–16)
LDL CHOLESTEROL CALCULATED: NORMAL
LYMPHOCYTES ABSOLUTE: 1900 /ΜL
LYMPHOCYTES RELATIVE PERCENT: 27.3 %
MCH RBC QN AUTO: 30.7 PG
MCHC RBC AUTO-ENTMCNC: 33.7 G/DL
MCV RBC AUTO: 91.2 FL
MICROALBUMIN/CREAT 24H UR: <7 MG/G{CREAT}
MICROALBUMIN/CREAT UR-RTO: <8.8
MONOCYTES ABSOLUTE: 500 /ΜL
MONOCYTES RELATIVE PERCENT: 7.6 %
NEUTROPHILS ABSOLUTE: 4300 /ΜL
NEUTROPHILS RELATIVE PERCENT: 62 %
NONHDLC SERPL-MCNC: NORMAL MG/DL
PDW BLD-RTO: 13.9 %
PLATELET # BLD: 235 K/ΜL
PMV BLD AUTO: ABNORMAL FL
POTASSIUM SERPL-SCNC: 4.3 MMOL/L
RBC # BLD: 3.86 10^6/ΜL
SODIUM BLD-SCNC: 143 MMOL/L
TOTAL PROTEIN: 7.1
TRIGL SERPL-MCNC: 139 MG/DL
VLDLC SERPL CALC-MCNC: 27 MG/DL
WBC # BLD: 6.9 10^3/ML

## 2021-10-11 ENCOUNTER — OFFICE VISIT (OUTPATIENT)
Dept: INTERNAL MEDICINE CLINIC | Age: 76
End: 2021-10-11
Payer: MEDICARE

## 2021-10-11 VITALS
WEIGHT: 203.8 LBS | HEIGHT: 66 IN | TEMPERATURE: 97.3 F | SYSTOLIC BLOOD PRESSURE: 138 MMHG | HEART RATE: 73 BPM | DIASTOLIC BLOOD PRESSURE: 67 MMHG | BODY MASS INDEX: 32.75 KG/M2

## 2021-10-11 DIAGNOSIS — E11.9 TYPE 2 DIABETES MELLITUS WITHOUT COMPLICATION, WITHOUT LONG-TERM CURRENT USE OF INSULIN (HCC): ICD-10-CM

## 2021-10-11 PROCEDURE — G0108 DIAB MANAGE TRN  PER INDIV: HCPCS | Performed by: INTERNAL MEDICINE

## 2021-10-11 RX ORDER — EMPAGLIFLOZIN 10 MG/1
20 TABLET, FILM COATED ORAL DAILY
COMMUNITY
Start: 2021-09-23 | End: 2022-09-20

## 2021-10-11 ASSESSMENT — PATIENT HEALTH QUESTIONNAIRE - PHQ9
1. LITTLE INTEREST OR PLEASURE IN DOING THINGS: NOT AT ALL
2. FEELING DOWN, DEPRESSED OR HOPELESS: NOT AT ALL
SUM OF ALL RESPONSES TO PHQ9 QUESTIONS 1 & 2: 0
DEPRESSION UNABLE TO ASSESS: YES

## 2021-10-11 NOTE — PROGRESS NOTES
[facility]  [facility]. [address] Horace., [facility], [address]  [phone] (phone)  [phone] (fax)    Patient ID: Hannah Segovia [id number]  Referring Provider: Dr. Anabelle Mcneill     Patient's name and  were verified. Subjective:    She presents for Her follow-up diabetic visit. She has type 2 diabetes mellitus. Home regimen includes: Sulfonylurea, GLP-1 Agonist and SGLT-2 inhibitors She is noncompliant some of the time. Assessment:     Lab Results   Component Value Date    LABA1C 6.5 2020    BUN 15 2020    CREATININE 1.22 2020     There were no vitals filed for this visit. Wt Readings from Last 3 Encounters:   21 203 lb (92.1 kg)   21 205 lb (93 kg)   21 199 lb 9.6 oz (90.5 kg)     Ht Readings from Last 3 Encounters:   21 5' 6\" (1.676 m)   21 5' 6\" (1.676 m)   21 5' 6\" (1.676 m)       Glucose at 1 hrs PPD today resulted at 228mg/dl  Current monitoring regimen: home blood tests - 1 times daily  Home blood sugar trends: FBS's   Any episodes of hypoglycemia? no  Depression screening completed 10/11/21 score 0  Previous visit with dietician: yes - 3/11/21  Current diet: B yogurt/ protein drink  Or coffee/ toast                      L chicken/ oriental veggies/ potato/ cheese                      D mixed nuts; drum stick                              1/2 large pretzel                              m potato/ dressing/ roast beef/ salad/ pie/                                       Green beans  Current exercise: [employer] 2 times per week. Taking a trip every 2-3 weeks with a lot of walking. Eye exam current (within one year): yes Dr. Trisha Reyes 10/4/21. Appt 22. Hx laser 5-6 yrs ago  Any history of foot problems? no  Last foot exam: 10/11/21 Pedal pulses:   peripheral pulses symmetrical   Results of monofilament test: 10/10              Skin noted to be warm, pale and hair is reduced. Great nails thick bilat.   Immunizations up to date: yes - 2021  Taking ASA:  Yes   Appropriate for use of MyChart Glucose Grid:  Yes    Focus:     Diabetes education. Recent A1C 8.9%. Ada Hathaway is not paying attention to her meal plan. She is taking many trips that involve more extensive dining/ desserts. She is taking in more carbs than recommended when she is away from home and this can be several times per month. She reports walking a lot, but only has 2 days of structured exercise. Ada Hathaway did not bring her meter today and A1C was retrieved after Ada Hathaway left. We will need to follow up with her about returning to the clinic sooner. Glucose levels are getting too high. Current follow up 2 months with KG JIMENEZ PLAN:   Discussed general issues about diabetes pathophysiology and management. Counseling at today's visit: BG goals; carbs; exercise; medication actions; timing of SGM. Set limits on your food choices          --leave out the potato, bread, dressing if you are going to have             Dessert. Think about taking dessert home for lunch the next day  Consider checking blood sugar 2 hours after breakfast on 2 days                --when you have your protein shake and another carbohydrate                          (like banana or yogurt with your protein shake)                      GOAL is under 160 (under 180 is ok)  Stay active--walk everyday and [employer] at least 2 days per week                  The more active you are--the better your  Insulin works for you! Meter download, medications, PMH and nursing assessment reviewed. Kamala Sandoval states She is willing to participate in this plan of care and verbalized understanding of all instructions provided. Teach back used to verify comprehension. Total time involved in direct patient education: 60 minutes.

## 2021-10-11 NOTE — PATIENT INSTRUCTIONS
Set limits on your food choices          --leave out the potato, bread, dressing if you are going to have             Dessert. Think about taking dessert home for lunch the next day  Consider checking blood sugar 2 hours after breakfast on 2 days                --when you have your protein shake and another carbohydrate                          (like banana or yogurt with your protein shake)                      GOAL is under 160 (under 180 is ok)  Stay active--walk everyday and [employer] at least 2 days per week                  The more active you are--the better your  Insulin works for you!

## 2021-10-14 ENCOUNTER — TELEPHONE (OUTPATIENT)
Dept: INTERNAL MEDICINE CLINIC | Age: 76
End: 2021-10-14

## 2021-10-14 NOTE — TELEPHONE ENCOUNTER
Per Norma Fagan RN, CDE, called patient and left a message asking for a return call to schedule her with a provider in our office due to her A1c going up to 8.9.

## 2021-12-16 ENCOUNTER — OFFICE VISIT (OUTPATIENT)
Dept: INTERNAL MEDICINE CLINIC | Age: 76
End: 2021-12-16

## 2021-12-16 VITALS — BODY MASS INDEX: 32.62 KG/M2 | HEIGHT: 66 IN | WEIGHT: 203 LBS | TEMPERATURE: 97.8 F

## 2021-12-16 DIAGNOSIS — E11.9 TYPE 2 DIABETES MELLITUS WITHOUT COMPLICATION, WITHOUT LONG-TERM CURRENT USE OF INSULIN (HCC): ICD-10-CM

## 2021-12-16 NOTE — PROGRESS NOTES
[address] [place] [address]. [address] [place] [address] Rd., [facility], [address]  [phone] (phone)  [phone] (fax)    Patient Name: Kathryn De La Cruz. Date of Birth: [id number]. MRN: [id number]      Assessment: Patient is a 64 y.o. female seen for follow-up MNT visit for Type 2 DB.     -Nutritionally relevant labs:   Lab Results   Component Value Date/Time    LABA1C 8.9 09/13/2021 12:00 AM    LABA1C 6.5 09/14/2020 12:00 AM    LABA1C 6.4 04/24/2020 12:00 AM    GLUCOSE 134 09/13/2021 12:00 AM    GLUCOSE 61 (L) 07/09/2020 09:13 AM    GLUCOSE 65 04/24/2020 12:00 AM    CHOL 153 09/13/2021 12:00 AM    HDL 60 09/13/2021 12:00 AM    LDLCALC 75 09/11/2017 12:00 AM    TRIG 139 09/13/2021 12:00 AM     -Blood sugar trends: Checks FBS most of the time. She stated she checked her BS once, 2 hours after a meal and   FBS:  104, 156, 157, 125, 110,133., 94, 138, 87.    -Food recall:   Breakfast: Supplement drink - whatever is on sale &/or has a coupon for. Instant oatmeal once every 1-2 weeks. Lunch or Dinner examples: Grilled Chicken Tenders, Broccoli and sourdough bread from [employer] OR Grilled [place] meal from [employer]  ([place]) - Fried chicken, mashed pot, noodles and dressing . Snacks: If she eats raw veggies she likes to dip them into pbutter. Occ she will eat a salad but states this is hard on her digestive system with her malfunction of esophageal sphincter valve. Pt mentioned she took a dessert home for later time to eat. When she has a sweet roll she only eats 1/2 of it and saves the other 1/2 for another time. -Main Beverages: unsw tea and water 2-3 bottles/day. Exercise:  Pt verified that she does the Current exercise: 1 -1.5 hours ; 1 day per week                    6 miles on bike. 40lb arm weights. Leg resistance 40 reps. Arm lifts 10 reps. On days she does not work out she tries to take walks.     -Impression of Dietary Intake: on average, 3 meals per day, low fiber, lacking routine intake of fruits and vegetables, 14x/week eating out    Current Outpatient Medications on File Prior to Visit   Medication Sig Dispense Refill    JARDIANCE 10 MG tablet Take 10 mg by mouth daily      Nutritional Supplements (BOOST/FIBER PO) Take 1 Bottle by mouth as needed For meal replacement      CINNAMON PO Take by mouth      b complex vitamins capsule Take 1 capsule by mouth daily      ONGLYZA 5 MG TABS tablet Take 5 mg by mouth daily      losartan (COZAAR) 25 MG tablet Take by mouth daily       glyBURIDE (DIABETA) 5 MG tablet 5 mg 2 times daily (with meals) 1 tab in am & 1 tab in the evening.  calcium carbonate (OSCAL) 500 MG TABS tablet Take 500 mg by mouth daily. Please verify dosage.  MULTIPLE VITAMIN PO Take 1 tablet by mouth daily.  aspirin 81 MG tablet Take 81 mg by mouth every other day       levothyroxine (SYNTHROID) 25 MCG tablet Take 25 mcg by mouth Daily Take 1 1/2 tablets daily      simvastatin (ZOCOR) 40 MG tablet Take 40 mg by mouth nightly.  omeprazole (PRILOSEC) 20 MG delayed release capsule Take 1 capsule by mouth every morning (before breakfast) 90 capsule 2    Nutritional Supplements (GLUCERNA 1.0 AYDEE/CARBSTEADY) LIQD Take 1 Bottle by mouth 2 times daily 474 mL 6     No current facility-administered medications on file prior to visit. Vitals from current and previous visits:  Temp 97.8 °F (36.6 °C)   Ht 5' 6\" (1.676 m)   Wt 203 lb (92.1 kg)   BMI 32.77 kg/m²     -Body mass index is 32.77 kg/m². 30-34.9 - Obesity Grade I.   - Patient gained and 3 pounds over the last 9 mo. .  -Weight goal: lose weight. Nutrition Diagnosis:   Food and nutrition-related knowledge deficit related to trying to learn/practice nutrition recommendations as evidenced by Patient report of making healthy menu options when eating out and limiting desserts.          Intervention:  -Impression: Pt eats all of her lunch and dinner meals outside the home. She does not cook. She travels around to play [id number] or to [employer]. -Instructed the patient on: Healthy Choices When [employer], Meal Planning for Regular, Balanced Meals & Snacks and The Importance of Regular Physical Activity Not to buy any other oral nutrition supplement drink other than Glucerna or Boost Glucose control.    -Handouts given for: food logging. Patient Instructions   Good job with your weekly workout at [employer]. - Try to go 2x/week.  -good job getting in your walking on other days of the week. Cut in 1/2 added [place] and pbutter with snacks. Good job selecting grilled or baked items when eating out. - Limit or avoid high fat condiments, such as, sour cream, dressings.  - Add cooked vegetables with meals when available - Good job selecting broccoli with meals.     Check BS 1-2 x/week after a meal - check 2 hours after the start of a meal.  BS goal:  100 - 150/160     Cut back on nuts from 1/3 cup to 1/4 cup to help with weight loss efforts. Drink adequate water - 3-4 bottles/day. Your caffeine free sugar free drinks can count toward your fluid intake too. But always good to drink water most of the time.    -Nutrition prescription: 1400 calories/day, 150 g carbs/day. Comprehension verified using teachback method. Monitoring/Evaluation:   -Followup visit: 6 mo with dietitian.   -Receptiveness to education/goals: Agreeable.  -Evaluation of education: Indicates understanding.  -Readiness to change: precontemplative- going to [employer] 2x/week, drinking more water, cutting back on added fats and nuts to help with weight loss efforts.  -Expected compliance: fair. Thank you for your referral of this patient. Total time involved in direct patient education: 30 minutes for follow-up MNT visit.

## 2021-12-20 ENCOUNTER — HOSPITAL ENCOUNTER (OUTPATIENT)
Dept: MAMMOGRAPHY | Age: 76
Discharge: HOME | End: 2021-12-20
Payer: MEDICARE

## 2021-12-20 DIAGNOSIS — Z12.31 VISIT FOR SCREENING MAMMOGRAM: ICD-10-CM

## 2021-12-20 PROCEDURE — 77063 BREAST TOMOSYNTHESIS BI: CPT

## 2022-03-16 LAB
ALBUMIN SERPL-MCNC: 3.8 G/DL
ALP BLD-CCNC: 78 U/L
ALT SERPL-CCNC: 13 U/L
ANION GAP SERPL CALCULATED.3IONS-SCNC: 5 MMOL/L
AST SERPL-CCNC: 13 U/L
AVERAGE GLUCOSE: 192
BASOPHILS ABSOLUTE: 0 /ΜL
BASOPHILS RELATIVE PERCENT: 0.5 %
BILIRUB SERPL-MCNC: 0.5 MG/DL (ref 0.1–1.4)
BUN BLDV-MCNC: 25 MG/DL
CALCIUM SERPL-MCNC: 9.4 MG/DL
CHLORIDE BLD-SCNC: 107 MMOL/L
CHOLESTEROL, TOTAL: 140 MG/DL
CHOLESTEROL/HDL RATIO: 2.1
CO2: 29 MMOL/L
CREAT SERPL-MCNC: 1.39 MG/DL
CREATININE, URINE: 152.9
EOSINOPHILS ABSOLUTE: 100 /ΜL
EOSINOPHILS RELATIVE PERCENT: 1 %
GFR CALCULATED: 37
GLUCOSE BLD-MCNC: 81 MG/DL
HBA1C MFR BLD: 8.3 %
HCT VFR BLD CALC: 36.6 % (ref 36–46)
HDLC SERPL-MCNC: 65 MG/DL (ref 35–70)
HEMOGLOBIN: 12.1 G/DL (ref 12–16)
LDL CHOLESTEROL CALCULATED: NORMAL
LYMPHOCYTES ABSOLUTE: 2100 /ΜL
LYMPHOCYTES RELATIVE PERCENT: 23.3 %
MCH RBC QN AUTO: 29.8 PG
MCHC RBC AUTO-ENTMCNC: 33.2 G/DL
MCV RBC AUTO: 89.7 FL
MICROALBUMIN/CREAT 24H UR: 17.5 MG/G{CREAT}
MICROALBUMIN/CREAT UR-RTO: 11.4
MONOCYTES ABSOLUTE: 800 /ΜL
MONOCYTES RELATIVE PERCENT: 8.8 %
NEUTROPHILS ABSOLUTE: 6000 /ΜL
NEUTROPHILS RELATIVE PERCENT: 66.4 %
NONHDLC SERPL-MCNC: NORMAL MG/DL
PDW BLD-RTO: 14.2 %
PLATELET # BLD: 216 K/ΜL
PMV BLD AUTO: NORMAL FL
POTASSIUM SERPL-SCNC: 3.7 MMOL/L
RBC # BLD: 4.08 10^6/ΜL
SODIUM BLD-SCNC: 141 MMOL/L
TOTAL PROTEIN: 7.1
TRIGL SERPL-MCNC: 92 MG/DL
VLDLC SERPL CALC-MCNC: 18 MG/DL
WBC # BLD: 9 10^3/ML

## 2022-04-13 ENCOUNTER — OFFICE VISIT (OUTPATIENT)
Dept: INTERNAL MEDICINE CLINIC | Age: 77
End: 2022-04-13

## 2022-04-13 VITALS
TEMPERATURE: 97 F | HEART RATE: 91 BPM | BODY MASS INDEX: 31.79 KG/M2 | HEIGHT: 66 IN | DIASTOLIC BLOOD PRESSURE: 58 MMHG | SYSTOLIC BLOOD PRESSURE: 94 MMHG | WEIGHT: 197.8 LBS

## 2022-04-13 DIAGNOSIS — E11.9 TYPE 2 DIABETES MELLITUS WITHOUT COMPLICATION, WITHOUT LONG-TERM CURRENT USE OF INSULIN (HCC): ICD-10-CM

## 2022-04-13 RX ORDER — DULAGLUTIDE 0.75 MG/.5ML
0.75 INJECTION, SOLUTION SUBCUTANEOUS WEEKLY
COMMUNITY
Start: 2022-03-22 | End: 2022-09-20 | Stop reason: DRUGHIGH

## 2022-04-13 ASSESSMENT — PATIENT HEALTH QUESTIONNAIRE - PHQ9
SUM OF ALL RESPONSES TO PHQ QUESTIONS 1-9: 0
2. FEELING DOWN, DEPRESSED OR HOPELESS: 0
SUM OF ALL RESPONSES TO PHQ QUESTIONS 1-9: 0
SUM OF ALL RESPONSES TO PHQ9 QUESTIONS 1 & 2: 0
1. LITTLE INTEREST OR PLEASURE IN DOING THINGS: 0
SUM OF ALL RESPONSES TO PHQ QUESTIONS 1-9: 0
SUM OF ALL RESPONSES TO PHQ QUESTIONS 1-9: 0

## 2022-04-13 NOTE — PROGRESS NOTES
[facility]  [address] Magda Das, [address]  [phone] (phone)  [phone] (fax)    Patient ID: Louie Perez [id number]  Referring Provider: Dr. Kaylene Ralph     Patient's name and  were verified. Subjective:    She presents for Her follow-up diabetic visit. She has type 2 diabetes mellitus. Home regimen includes: Sulfonylurea, GLP-1 Agonist and SGLT-2 inhibitors She is noncompliant some of the time. Assessment:     Lab Results   Component Value Date    LABA1C 8.9 2021    BUN 19 2021    CREATININE 1.40 2021     There were no vitals filed for this visit. Wt Readings from Last 3 Encounters:   21 203 lb (92.1 kg)   10/11/21 203 lb 12.8 oz (92.4 kg)   21 203 lb (92.1 kg)     Ht Readings from Last 3 Encounters:   21 5' 6\" (1.676 m)   10/11/21 5' 6\" (1.676 m)   21 5' 6\" (1.676 m)       Glucose at 1.25 hrs PPD today resulted at 178mg/dl  Current monitoring regimen: home blood tests - 1 times daily  Home blood sugar trends: FBS's 67, , 184, 181, 264  Any episodes of hypoglycemia? yes - 1-2 per week fasting  Depression screening completed 22 Score 0  Previous visit with dietician: yes - 21  Current diet: B boost/ couple bites cheese cake                       L fish/ french fries -ate small portion                       D salmon/ ginger/ m potato                       Snacks -evening nuts/ s berries/ 2 times per week--something sweet  Current exercise: [place] 1-2 times per week; overall moderate to active ADL's  Eye exam current (within one year): yes 22 Dr. Berhane Zuniga. Stable (hx laser 5-6yrs ago)  Any history of foot problems? no  Last foot exam: 10/11/21  Immunizations up to date: yes - . COVID yes/ no booster  Taking ASA:  Yes   Appropriate for use of MyChart Glucose Grid:  Yes    Focus:     Diabetes education. A1C from 3/16/22 8.3%. Weight is down 5-6# since last visit.  Zuhair medication has been recently adjusted for her glucose control. She reports mild/ moderate upset stomach from the Trulicity, but is agreeable to continue this therapy. She is fairly active and almost always on the go with various trips. Generally she is making reasonable dietary choices, but some days she reports not having much choice. She could increase exercise efforts, but expresses minimal commitment to this. FBS's vary significantly. Suspect poor bedtime snacks as a culprit and asking the [facility] track this. Follow up 2 months. DSME PLAN:   Discussed general issues about diabetes pathophysiology and management. Counseling at today's visit: BG goals; carbs; exercise; evaluating elevated BS levels; SGM-timing. When your wake up and your blood sugar is over 180           = make a note of what you had for bedtime snack or if it an                    Unusual supper   IF you keep seeing blood sugars less than 90 in the morning--more than                   2-3 per week- ask Dr. Cristina Hernandez about decreasing your Glyburide                   By 1 pill in the evening  Stay active --keep going to the fitness center                   Walk as much as you can when you travel  Watch the fried foods --they can  make your stomach feel worse  Set limits on sweets that will make your blood sugars higher  Check blood sugars before dinner or bedtime at least 1-2 times per week  Meter download, medications, PMH and nursing assessment reviewed. Nkechi Heron states She is willing to participate in this plan of care and verbalized understanding of all instructions provided. Teach back used to verify comprehension. Total time involved in direct patient education: 60 minutes.

## 2022-04-13 NOTE — PATIENT INSTRUCTIONS
When your wake up and your blood sugar is over 180           = make a note of what you had for bedtime snack or if it an                    Unusual supper   IF you keep seeing blood sugars less than 90 in the morning--more than                   2-3 per week- ask Dr. Cristhian Lara about decreasing your Glyburide                   By 1 pill in the evening  Stay active --keep going to the fitness center                   Walk as much as you can when you travel  Watch the fried foods --they can  make your stomach feel worse  Set limits on sweets that will make your blood sugars higher  Check blood sugars before dinner or bedtime at least 1-2 times per week

## 2022-06-14 ENCOUNTER — OFFICE VISIT (OUTPATIENT)
Dept: INTERNAL MEDICINE CLINIC | Age: 77
End: 2022-06-14
Payer: MEDICARE

## 2022-06-14 VITALS — BODY MASS INDEX: 31.24 KG/M2 | TEMPERATURE: 98 F | WEIGHT: 194.4 LBS | HEIGHT: 66 IN

## 2022-06-14 DIAGNOSIS — E11.9 TYPE 2 DIABETES MELLITUS WITHOUT COMPLICATION, WITHOUT LONG-TERM CURRENT USE OF INSULIN (HCC): ICD-10-CM

## 2022-06-14 PROCEDURE — 97803 MED NUTRITION INDIV SUBSEQ: CPT | Performed by: DIETITIAN, REGISTERED

## 2022-06-14 NOTE — PROGRESS NOTES
[address] [place] [address]. [address] [place] [address] Horace., [facility], [facility]  [phone] (phone)  [phone] (fax)    Patient Name: Claudell Bender. Date of Birth: [id number]. MRN: [id number]      Assessment: Patient is a 64 y.o. female seen for follow-up MNT visit for Type 2 DB.     -Nutritionally relevant labs:   Lab Results   Component Value Date/Time    LABA1C 8.3 03/16/2022 12:00 AM    LABA1C 8.9 09/13/2021 12:00 AM    LABA1C 6.5 09/14/2020 12:00 AM    GLUCOSE 81 03/16/2022 12:00 AM    GLUCOSE 134 09/13/2021 12:00 AM    GLUCOSE 61 (L) 07/09/2020 09:13 AM    CHOL 140 03/16/2022 12:00 AM    HDL 65 03/16/2022 12:00 AM    LDLCALC 75 09/11/2017 12:00 AM    TRIG 92 03/16/2022 12:00 AM     Info from prior RD visits:  Pt travels a lot and eats out and does not cook. Nieves [place]wood will not heat up food on the stove. She sometimes uses the microwave. She does not eat frozen meals. Pt relies on eating out or going to family and friends houses and eats with them what they make.     Goes to casinos and often takes bus trips.      Today's Visit:    -Blood sugar trends: Usually checks FBS. FBS:  90 - 153, highest 168.  2 hypo BS 59.  Other BS check after drinking a Latte with rasp/white chocolate flavoring     -Food recall:   Breakfast: usually protein drink OR yogurt OR other drink made with Milk, banana and espresso. Lunch: sometimes skips - snacks on nuts and fresh fruit. Last night supper - yogurt because at out for lunch - Shrimp and water followed by the flavored latte coffee (rasp white janell and espresso). Usually when she eats out she will substitute broccoli in place of fries. One day got onion rings because tired of broccoli. 1 year since botox of her esophageal sphincter - for tx of achalasia. She c/o some difficulty swallowing but not all of the time. One day recent went to [employer] then [employer].   Other bus trip - [address], [place], [facility] meal.    Once a month will do an overnight casino. Once a week to casino.    -Main Beverages: Drinks a lot of herbal iced tea sugar free. Water with meals. Boost with fiber drinks ~ 4x/week. Pt verified that she does the Current exercise: 1 -1.5 hours ; 2 days per week 6 miles on bike (2 diff bikes). (4 machines) 40lb arm weights. Leg resistance 55# reps. Arm lifts 10 reps. On days she does not work out she tries to take walks. -Impression of Dietary Intake: on average, 2-3 meals per day, high fat/ cholesterol    Current Outpatient Medications on File Prior to Visit   Medication Sig Dispense Refill    TRULICITY 1.92 OM/6.3IF SOPN Inject 0.75 mg into the skin once a week      Calcium Carbonate Antacid (TUMS E-X PO) Take by mouth as needed      JARDIANCE 10 MG tablet Take 20 mg by mouth daily       Nutritional Supplements (BOOST/FIBER PO) Take 1 Bottle by mouth as needed For meal replacement      CINNAMON PO Take by mouth      b complex vitamins capsule Take 1 capsule by mouth daily      losartan (COZAAR) 25 MG tablet Take by mouth daily       glyBURIDE (DIABETA) 5 MG tablet 10 mg 2 times daily (with meals) Taking 1 tab daily every pm      calcium carbonate (OSCAL) 500 MG TABS tablet Take 500 mg by mouth daily. Please verify dosage.  MULTIPLE VITAMIN PO Take 1 tablet by mouth daily.  aspirin 81 MG tablet Take 81 mg by mouth every 3 days       levothyroxine (SYNTHROID) 25 MCG tablet Take 25 mcg by mouth Daily Take 1 1/2 tablets daily      simvastatin (ZOCOR) 40 MG tablet Take 40 mg by mouth nightly.  omeprazole (PRILOSEC) 20 MG delayed release capsule Take 1 capsule by mouth every morning (before breakfast) 90 capsule 2    Nutritional Supplements (GLUCERNA 1.0 AYDEE/CARBSTEADY) LIQD Take 1 Bottle by mouth 2 times daily 474 mL 6     No current facility-administered medications on file prior to visit.         Vitals from current and previous visits:  Vitals 10/22/2020   Weight 192 lb 12.8 oz   Height 5' 6\"   Body mass index 31.12 kg/m2     12/16/2021 -                  Ht 5' 6\" (1.676 m)   Wt 203 lb (92.1 kg)   BMI 32.77 kg/m²   Temp 98 °F (36.7 °C)   Ht 5' 6\" (1.676 m)   Wt 194 lb 6.4 oz (88.2 kg)   BMI 31.38 kg/m²     -Body mass index is 31.38 kg/m². 30-34.9 - Obesity Grade I.   - Patient lost and 9 pounds over the last 6 mo. .  -Weight goal: lose weight. Nutrition Diagnosis:   Food and nutrition-related knowledge deficit related to currently undergoing MNT as evidenced by Food recall. Intervention:  -Instructed the patient on: Meal Planning for Regular, Balanced Meals & Snacks, The Importance of Regular Physical Activity and low fat diet tips    -Handouts given for: food logging, coupons. Patient Instructions   Try not to skip mid-day meal so it does not make us overeat at supper. Eat consistent carbs through the day. - 3 carb servings/meal  - add protein  - add non-starchy veggies anytime. Good idea to eat only 1/2 a dessert and then not have your dinner roll with your meal.    Keep drinking your water with lemon - this is a good choice. Sugar free drinks are ok. Good job getting in your workouts 2x/week!!     Bring your food log and meter to next dietitian appt. -Nutrition prescription: 1400 calories/day, 150 g carbs/day. Comprehension verified using teachback method. Monitoring/Evaluation:   -Followup visit: 6 mo with dietitian.   -Receptiveness to education/goals: Agreeable.  -Evaluation of education: Indicates understanding.  -Readiness to change: contemplation - ambivalent about change not skipping lunch, preventing carb overload meals. -Expected compliance: fair. Thank you for your referral of this patient. Total time involved in direct patient education: 30 minutes for follow-up MNT visit.

## 2022-06-14 NOTE — PATIENT INSTRUCTIONS
Try not to skip mid-day meal so it does not make us overeat at supper. Eat consistent carbs through the day. - 3 carb servings/meal  - add protein  - add non-starchy veggies anytime. Good idea to eat only 1/2 a dessert and then not have your dinner roll with your meal.    Keep drinking your water with lemon - this is a good choice. Sugar free drinks are ok. Good job getting in your workouts 2x/week!!     Bring your food log and meter to next dietitian appt.

## 2022-06-28 LAB
AVERAGE GLUCOSE: NORMAL
HBA1C MFR BLD: 7.3 %

## 2022-09-20 ENCOUNTER — PHARMACY VISIT (OUTPATIENT)
Dept: INTERNAL MEDICINE CLINIC | Age: 77
End: 2022-09-20
Payer: MEDICARE

## 2022-09-20 DIAGNOSIS — E11.9 TYPE 2 DIABETES MELLITUS WITHOUT COMPLICATION, WITHOUT LONG-TERM CURRENT USE OF INSULIN (HCC): Primary | ICD-10-CM

## 2022-09-20 PROCEDURE — G0108 DIAB MANAGE TRN  PER INDIV: HCPCS | Performed by: INTERNAL MEDICINE

## 2022-09-20 RX ORDER — EMPAGLIFLOZIN 25 MG/1
TABLET, FILM COATED ORAL
COMMUNITY
Start: 2022-08-01

## 2022-09-20 RX ORDER — DULAGLUTIDE 1.5 MG/.5ML
INJECTION, SOLUTION SUBCUTANEOUS
COMMUNITY
Start: 2022-08-18

## 2022-09-20 NOTE — PROGRESS NOTES
[facility]  [facility]. [address] [address], [facility] [place], [address]  [phone] (phone)  [phone] (fax)    Patient ID: Murtis Jeans 1945  Referring Provider: Dr. Sergio Santoro     Patient's name and  were verified. Subjective:    She presents for Her follow-up diabetic visit. She has type 2 diabetes mellitus. Home regimen includes: Sulfonylurea, GLP-1 Agonist and SGLT-2 inhibitors She is noncompliant some of the time.      Assessment:     Lab Results   Component Value Date/Time    LABA1C 8.3 2022 12:00 AM    BUN 25 2022 12:00 AM    CREATININE 1.39 2022 12:00 AM     Wt Readings from Last 3 Encounters:   22 194 lb 6.4 oz (88.2 kg)   22 197 lb 12.8 oz (89.7 kg)   21 203 lb (92.1 kg)     Ht Readings from Last 3 Encounters:   22 5' 6\" (1.676 m)   22 5' 6\" (1.676 m)   21 5' 6\" (1.676 m)       Diabetes Pharmacotherapy:  Trulicity 8.7RS weekly  Jardiance 25mg daily  Glyburide 10mg QAM and 5mg QPM    Glucose Trends:   Current monitoring regimen: Fingerstick blood tests - 1 times daily  Home blood sugar trends: patient recall; forgot glucometer today   -Fasting AM: 135, 100-150, 99  Any episodes of hypoglycemia? no    Lifestyle Factors:   Previous visit with dietician: yes - 2021  Current diet: B: Protein drink or Boost            L: Grilled tenders + carrots OR broccoli + grilled sourdough OR yogurt + cottage cheese + fresh fruit                       D: Fried walleye OR grilled tilapia/salmon + small portion m potatoes & gravy + carrots                       Snacks: yogurt OR nuts                       Beverages: unsweetened iced tea, coffee periodically, [employer] pumpkin spice coffee  Current exercise:  6 mile biking at [employer] 1-2 times per week    Health Maintenance:  Eye exam current (within one year): yes Date: 2022, Dr. Case Castillo  Any history of foot problems? no  Foot exam up to date: yes, today  Skin: without breakdown   Ulcers/calluses: none   Nails: normal   Pedal pulses:    Dorsalis pedis, left: present    Dorsalis pedis, right: present    Posterior tibial, left: present    Posterior tibial, right: present  Results of monofilament test:    Left: 5/10   Right: 8/10  The 10-year ASCVD risk score (Lester Weir., et al., 2013) is: 33.5%  Last panel - LDL:   Lab Results   Component Value Date    LDLCALC 75 09/11/2017   Taking ASA:  Yes   Taking statin: Yes - simvastatin  Last microalbumin/creatinine ratio:   Microalbumin Creatinine Ratio   Date Value Ref Range Status   03/16/2022 11.4  Final    Taking ACE/ARB: Yes- losartan    Focus:   Diabetes Education. Last A1C: 7.3% (6/23/22) at Dr. Bobetta Holstein office. Zuhair checks her fasting BG once daily; however, these results are unavailable for review today. Per patient report, fasting glucose readings are at goal. We reviewed Zuhair's diet, which contains a moderate amount of CHO. We discussed which protein/dietary supplement drinks are most beneficial for her. Foot exam today demonstrated significant neuropathy. Discussed appropriate foot care. Curriculum Area Focus: Foot care, Cardiovascular risk management, Glucose checks/goals, Carbohydrate counting/meal planning, and Physical activity goals  DSME PLAN:     Watch which types of drinks you are purchasing; make sure they have a serving of carbs or less (~15grams carbs):  -Ensure Max Protein or Premier Protein = high protein, good for breakfast  -Glucerna Carb Steady, Boost Glucose Control = good for breakfast or a snack    2. Be sure to check over your feet every day - look at the bottoms of your feet/between your toes   -You can apply lotion to the tops/bottoms of your feet a few times per week    3. Great job on your [employer] loss! Keep up the good work with making smart dietary choices       Goals Addressed    None          Meter download, medications, PMH and nursing assessment reviewed.   Lemuel Rubio states She is willing to participate in this plan of care and verbalized understanding of all instructions provided. Teach back used to verify comprehension. Follow-up: 5 month VADIM Myers RN    Total time involved in direct patient education: 60 minutes.      For [facility] in place:  No  Recommendation Provided To: Patient/Caregiver: 1 via In person  Intervention Detail: Lab(s) Ordered  Gap Closed?: Yes   Intervention Accepted By: Patient/Caregiver: 1  Time Spent (min): [address], PharmD, [facility]  Internal Medicine Clinical Pharmacist  [phone]

## 2022-09-20 NOTE — PATIENT INSTRUCTIONS
Watch which types of drinks you are purchasing; make sure they have a serving of carbs or less (~15grams carbs):  -Ensure Max Protein or Premier Protein = high protein, good for breakfast  -Glucerna Carb Steady, Boost Glucose Control = good for breakfast or a snack    2. Be sure to check over your feet every day - look at the bottoms of your feet/between your toes   -You can apply lotion to the tops/bottoms of your feet a few times per week    3. Great job on your [employer] loss!  Keep up the good work with making smart dietary choices

## 2022-12-14 ENCOUNTER — OFFICE VISIT (OUTPATIENT)
Dept: INTERNAL MEDICINE CLINIC | Age: 77
End: 2022-12-14

## 2022-12-14 VITALS — HEIGHT: 66 IN | BODY MASS INDEX: 30.67 KG/M2 | WEIGHT: 190.8 LBS

## 2022-12-14 DIAGNOSIS — E11.9 TYPE 2 DIABETES MELLITUS WITHOUT COMPLICATION, UNSPECIFIED WHETHER LONG TERM INSULIN USE (HCC): Primary | ICD-10-CM

## 2022-12-14 NOTE — PATIENT INSTRUCTIONS
The week before your next DB center appt with Thao Miner - do some extra BS checks. Bring your food log and meter to next dietitian appt. Try not to skip mid-day meal so it does not make us overeat at supper. Eat consistent carbs through the day. - 3 carb servings/meal  - add protein  - add non-starchy veggies anytime. Good idea to eat only 1/2 a dessert and then not have your dinner roll with your meal.     Keep drinking your water with lemon - this is a good choice. Sugar free drinks are ok.      Good job getting in your workouts 2x/week!!

## 2022-12-14 NOTE — PROGRESS NOTES
[address] [place] [address]. [address] [place] [address] [address]ace., [facility], [address]  [phone] (phone)  [phone] (fax)    Patient Name: Hero Gutiérrez. Date of Birth: [id number]. MRN: [id number]      Assessment: Patient is a 76 y.o. female seen for follow-up MNT visit for Type 2 DB.     -Nutritionally relevant labs:   Lab Results   Component Value Date/Time    LABA1C 8.3 03/16/2022 12:00 AM    LABA1C 8.9 09/13/2021 12:00 AM    LABA1C 6.5 09/14/2020 12:00 AM    GLUCOSE 81 03/16/2022 12:00 AM    GLUCOSE 134 09/13/2021 12:00 AM    GLUCOSE 61 (L) 07/09/2020 09:13 AM    CHOL 140 03/16/2022 12:00 AM    HDL 65 03/16/2022 12:00 AM    LDLCALC 75 09/11/2017 12:00 AM    TRIG 92 03/16/2022 12:00 AM      9/21/22 - Soto D visit. Watch which types of drinks you are purchasing; make sure they have a serving of carbs or less (~15grams carbs): - pt not paying attention to the carbs on her supplement drinks  -Ensure Max Protein or Premier Protein = high protein, good for breakfast  -Glucerna Carb Steady, Boost Glucose Control = good for breakfast or a snack     2. Be sure to check over your feet every day - look at the bottoms of your feet/between your toes              -You can apply lotion to the tops/bottoms of your feet a few times per week     3. Great job on your [employer] loss! Keep up the good work with making smart dietary choices    Today's Visit:  -Blood sugar trends: meter unable to download. Only checks FBS. FBS:  82 - 129  Denies low BS. Pt does not cook. -Food recall:   Current diet: B: Protein drink or Boost and sometimes with 1 sl toast with butter. L: Grilled tenders + carrots OR broccoli + grilled sourdough OR yogurt + cottage cheese + fresh fruit OR   Only had 1 meal - lunch yesterday - cousin's  made a soup - oriental veggies and chickpeas, beef and onion.                        D: [employer] OR grilled tilapia/salmon + small portion m potatoes & gravy + carrots                       Snacks: yogurt OR nuts - mixed, cashews or almonds Or raw carrots OR eating up some Baby food she had left over from 1 year ago when difficulty swallowing. Got her Esophageal sphincter valve fixed after that. She lost ~ 30# when having this swallowing issue. Beverages: unsweetened iced tea, coffee periodically, H[employer] pumpkin spice coffee  Current exercise:  6 mile biking at [employer] 1-2 times per week    Eating carrots and broccoli when at [employer] along with her grilled chicken tenders, sourdough 2 sl with jelly. , water with lemon and unsw iced teas. Gets a Burger at [employer] or other food items that are offered. Esophagus valve stretched and lost 30# before. -Main Beverages: Drinks a lot of herbal iced tea unsw, water with meals. Sometimes at the [employer] she can get fresh produce.    -Impression of Dietary Intake:  eats all meals out or at friend/family homes and oral supplement drink for brkf. Current Outpatient Medications on File Prior to Visit   Medication Sig Dispense Refill    TRULICITY 1.5 JA/0.5HL SOPN       JARDIANCE 25 MG tablet       Nutritional Supplements (BOOST/FIBER PO) Take 1 Bottle by mouth as needed For meal replacement      CINNAMON PO Take by mouth      b complex vitamins capsule Take 2 capsules by mouth daily      losartan (COZAAR) 25 MG tablet Take by mouth daily       glyBURIDE (DIABETA) 5 MG tablet 10 mg 2 times daily (with meals) Taking 1 tab daily every pm      calcium carbonate (OSCAL) 500 MG TABS tablet Take 500 mg by mouth daily. Please verify dosage. MULTIPLE VITAMIN PO Take 1 tablet by mouth daily. aspirin 81 MG tablet Take 81 mg by mouth every other day      levothyroxine (SYNTHROID) 25 MCG tablet Take 25 mcg by mouth Daily Take 1 1/2 tablets daily      simvastatin (ZOCOR) 40 MG tablet Take 40 mg by mouth nightly.       omeprazole (PRILOSEC) 20 MG delayed release capsule Take 1 capsule by mouth every morning (before breakfast) 90 capsule 2    Nutritional Supplements (GLUCERNA 1.0 AYDEE/CARBSTEADY) LIQD Take 1 Bottle by mouth 2 times daily 474 mL 6     No current facility-administered medications on file prior to visit. Vitals from current and previous visits:  Ht 5' 6\" (1.676 m)   Wt 190 lb 12.8 oz (86.5 kg)   BMI 30.80 kg/m²     -Body mass index is 30.8 kg/m². 30-34.9 - Obesity Grade I.   - Patient lost and 3 pounds over the last 6 mo. .  -Weight goal: lose weight. Nutrition Diagnosis:   Food and nutrition-related knowledge deficit and lack of value for lifestyle changes related to eating all meals away from home and as evidenced by Dx of T2 DB and Food recall. Intervention:  -Instructed the patient on: Meal Planning for Regular, Balanced Meals & Snacks and The Importance of Regular Physical Activity the appropriate oral supplement drinks for diabetes. -Handouts given for: food logging. Supplement drink coupons. Patient Instructions   The week before your next DB center appt with She Suarez - do some extra BS checks. Bring your food log and meter to next dietitian appt. Try not to skip mid-day meal so it does not make us overeat at supper. Eat consistent carbs through the day. - 3 carb servings/meal  - add protein  - add non-starchy veggies anytime. Good idea to eat only 1/2 a dessert and then not have your dinner roll with your meal.     Keep drinking your water with lemon - this is a good choice. Sugar free drinks are ok. Good job getting in your workouts 2x/week!!      -Nutrition prescription: 1400 calories/day, 150 g carbs/day. Comprehension verified using teachback method.     Monitoring/Evaluation:   -Followup visit: annually with dietitian.   -Receptiveness to education/goals: Agreeable.  -Evaluation of education: Indicates understanding.  -Readiness to change: precontemplative- not skipping lunch and action - ready to set

## 2022-12-29 ENCOUNTER — HOSPITAL ENCOUNTER (OUTPATIENT)
Dept: WOMENS IMAGING | Age: 77
Discharge: HOME | End: 2022-12-29
Payer: MEDICARE

## 2022-12-29 DIAGNOSIS — Z78.0 POSTMENOPAUSAL: ICD-10-CM

## 2022-12-29 DIAGNOSIS — Z12.31 SCREENING MAMMOGRAM, ENCOUNTER FOR: ICD-10-CM

## 2022-12-29 PROCEDURE — 77080 DXA BONE DENSITY AXIAL: CPT

## 2022-12-29 PROCEDURE — 77067 SCR MAMMO BI INCL CAD: CPT

## 2023-05-16 LAB
AVERAGE GLUCOSE: NORMAL
HBA1C MFR BLD: 6.5 %

## 2023-05-18 ENCOUNTER — OFFICE VISIT (OUTPATIENT)
Dept: INTERNAL MEDICINE CLINIC | Age: 78
End: 2023-05-18
Payer: MEDICARE

## 2023-05-18 VITALS
HEART RATE: 84 BPM | TEMPERATURE: 97.8 F | WEIGHT: 192 LBS | DIASTOLIC BLOOD PRESSURE: 65 MMHG | SYSTOLIC BLOOD PRESSURE: 119 MMHG | HEIGHT: 66 IN | BODY MASS INDEX: 30.86 KG/M2

## 2023-05-18 DIAGNOSIS — E11.9 TYPE 2 DIABETES MELLITUS WITHOUT COMPLICATION, WITHOUT LONG-TERM CURRENT USE OF INSULIN (HCC): Primary | ICD-10-CM

## 2023-05-18 PROCEDURE — G0108 DIAB MANAGE TRN  PER INDIV: HCPCS | Performed by: REGISTERED NURSE

## 2023-05-18 ASSESSMENT — PATIENT HEALTH QUESTIONNAIRE - PHQ9
SUM OF ALL RESPONSES TO PHQ QUESTIONS 1-9: 1
SUM OF ALL RESPONSES TO PHQ9 QUESTIONS 1 & 2: 1
SUM OF ALL RESPONSES TO PHQ QUESTIONS 1-9: 1
1. LITTLE INTEREST OR PLEASURE IN DOING THINGS: 0
2. FEELING DOWN, DEPRESSED OR HOPELESS: 1
SUM OF ALL RESPONSES TO PHQ QUESTIONS 1-9: 1
SUM OF ALL RESPONSES TO PHQ QUESTIONS 1-9: 1

## 2023-05-18 NOTE — PROGRESS NOTES
[facility]  [address] [place] [address], [address]  [phone] (phone)  [phone] (fax)    Patient ID: Savi Quintanilla 1945  Referring Provider: Dr. Tha Ramos     Patient's name and  were verified. Subjective:    She presents for a follow-up diabetic visit. She has type 2 diabetes mellitus. Home regimen includes: Sulfonylurea, GLP-1 Agonist, and SGLT-2 inhibitors She is compliant some of the time.   Assessment:     Lab Results   Component Value Date/Time    LABA1C 6.5 2023 12:00 AM    BUN 25 2022 12:00 AM    CREATININE 1.39 2022 12:00 AM     Vitals:    23 1040   BP: 119/65   Site: Right Upper Arm   Position: Sitting   Pulse: 84   Temp: 97.8 °F (36.6 °C)   Weight: 192 lb (87.1 kg)   Height: 5' 6\" (1.676 m)     Wt Readings from Last 3 Encounters:   23 192 lb (87.1 kg)   22 190 lb 12.8 oz (86.5 kg)   22 194 lb 6.4 oz (88.2 kg)     Ht Readings from Last 3 Encounters:   23 5' 6\" (1.676 m)   22 5' 6\" (1.676 m)   22 5' 6\" (1.676 m)       Diabetes Pharmacotherapy:  Trulicity 4.0SD weekly, Sundays  Jardiance 25mg daily  Glyburide 5mg: 10 mg in the morning; 5 mg evening    Glucose Trends:   Glucose at 1.5 hrs PPD today resulted at 191mg/dl  Current monitoring regimen: Fingerstick blood tests - 1 times daily  Home blood sugar trends:    -Fasting AM: \"'s, 169\"   -Before lunch:   -Before dinner:   -Bedtime:  Any episodes of hypoglycemia? no   -Treats with mints    Lifestyle Factors:   Previous visit with dietician: yes - 2022  Current diet: B: Boost            L: [employer] -veggies/ salad/ sw potato/ ice cream                           [employer] 3 / carrots/ sourdough bread/ unsw tea                       D: yogurt/ cottage cheese                       Snacks: boost or popcorn/ muffin/ rare juice/                       Beverages:  Current exercise: 1-2 times per [employer]

## 2023-05-18 NOTE — PATIENT INSTRUCTIONS
Keep up the great work! Stay active-keep going to the fitness center and keep walking on                        Your trips  Stay focused on limiting sweets (small portions at a time)  Any questions, call!! When you indulge in a high carb food or drink--you can see what                  It does to your blood sugar!                      Check your blood sugar 2 hours after eating/ drinking it                      Goal for 2hrs after eating is under 160/ 180                   Goal for your morning blood sugars

## 2023-12-26 ENCOUNTER — HOSPITAL ENCOUNTER (OUTPATIENT)
Dept: WOMENS IMAGING | Age: 78
Discharge: HOME | End: 2023-12-26
Payer: MEDICARE

## 2023-12-26 VITALS — BODY MASS INDEX: 30.86 KG/M2 | HEIGHT: 66 IN | WEIGHT: 192 LBS

## 2023-12-26 DIAGNOSIS — Z12.31 ENCOUNTER FOR SCREENING MAMMOGRAM FOR MALIGNANT NEOPLASM OF BREAST: ICD-10-CM

## 2023-12-26 PROCEDURE — 77063 BREAST TOMOSYNTHESIS BI: CPT

## 2024-01-09 ENCOUNTER — OFFICE VISIT (OUTPATIENT)
Dept: INTERNAL MEDICINE CLINIC | Age: 79
End: 2024-01-09
Payer: MEDICARE

## 2024-01-09 VITALS
HEART RATE: 87 BPM | HEIGHT: 66 IN | SYSTOLIC BLOOD PRESSURE: 116 MMHG | DIASTOLIC BLOOD PRESSURE: 67 MMHG | WEIGHT: 180.4 LBS | TEMPERATURE: 96.8 F | BODY MASS INDEX: 28.99 KG/M2

## 2024-01-09 DIAGNOSIS — E11.9 TYPE 2 DIABETES MELLITUS WITHOUT COMPLICATION, WITHOUT LONG-TERM CURRENT USE OF INSULIN (HCC): Primary | ICD-10-CM

## 2024-01-09 PROCEDURE — G0108 DIAB MANAGE TRN  PER INDIV: HCPCS | Performed by: REGISTERED NURSE

## 2024-01-09 RX ORDER — DULAGLUTIDE 3 MG/.5ML
3 INJECTION, SOLUTION SUBCUTANEOUS WEEKLY
COMMUNITY
Start: 2023-11-30

## 2024-01-09 NOTE — PROGRESS NOTES
[facility]  [address] [place] [address]  [phone] (phone)  [phone] (fax)    Patient ID: Zuhair Flores 1945  Referring Provider: Dr. STEVIE Luong     Patient's name and  were verified.    Subjective:    She presents for a follow-up diabetic visit. She has type 2 diabetes mellitus. She is compliant some of the time.  Assessment:     Lab Results   Component Value Date/Time    LABA1C 6.5 2023 12:00 AM    BUN 21 2023 09:19 AM    CREATININE 1.30 2023 09:19 AM     Vitals:    24 1245   BP: 116/67   Site: Right Upper Arm   Position: Sitting   Pulse: 87   Temp: 96.8 °F (36 °C)   Weight: 81.8 kg (180 lb 6.4 oz)   Height: 1.676 m (5' 6\")     Wt Readings from Last 3 Encounters:   24 81.8 kg (180 lb 6.4 oz)   23 87.1 kg (192 lb)   23 87.1 kg (192 lb)     Ht Readings from Last 3 Encounters:   24 1.676 m (5' 6\")   23 1.676 m (5' 6\")   23 1.676 m (5' 6\")     Gfr Calculated   Date Value Ref Range Status   2022 37  Final         Diabetes Pharmacotherapy:  Trulicity 1.5mg weekly; Sundays  Jardiance 25mg daily  Glyburide 5mg 2 tabs in the morning;  1 tab in the evening    Glucose Trends:   Glucose at 1.5 hrs PPD today resulted at 200mg/dl  Current monitoring regimen: Fingerstick blood tests - 1 times daily  Home blood sugar trends: Did not bring meter to this appointment   -Fasting AM: \"100-130's; 98\"  Any episodes of hypoglycemia? No; lowest 77   -Treats with orange juice    Lifestyle Factors:   Previous visit with dietician: yes - 2022  Current diet: B: Boost; sweet roll 1/3 piece            L: may skip                             Cracker barrel grilled chicken tenders/ carrots/ bread                       D: toast cheese sandwich/ tomato soup                       Snacks: nuts; chips-rare                       Beverages: water; unsw tea; iced coffee  Current exercise: 1-2 times per week if not traveling                   Walks

## 2024-01-09 NOTE — PATIENT INSTRUCTIONS
Increase your Trulicity to 3mg per Dr. Luong     -next dose take 1.5mg       Following dose take 2 of the 1.5mg doses to finish                Them up       Then begin using the 3mg dose weekly  Stay focused on keeping sugar/ carbohydrates out of               Your drinks  Good job having small portions of sweets  Blood sugar goal /150          A1C goal under 7.5%  Carry a treatment for low blood sugars!

## 2024-03-28 LAB
ESTIMATED AVERAGE GLUCOSE: ABNORMAL
HBA1C MFR BLD: 7 %

## 2024-07-22 ENCOUNTER — OFFICE VISIT (OUTPATIENT)
Dept: INTERNAL MEDICINE CLINIC | Age: 79
End: 2024-07-22

## 2024-07-22 VITALS
TEMPERATURE: 96.2 F | WEIGHT: 178 LBS | DIASTOLIC BLOOD PRESSURE: 88 MMHG | HEART RATE: 86 BPM | SYSTOLIC BLOOD PRESSURE: 136 MMHG | BODY MASS INDEX: 28.73 KG/M2

## 2024-07-22 DIAGNOSIS — E11.9 TYPE 2 DIABETES MELLITUS WITHOUT COMPLICATION, WITHOUT LONG-TERM CURRENT USE OF INSULIN (HCC): Primary | ICD-10-CM

## 2024-07-22 PROCEDURE — NBSRV NON-BILLABLE SERVICE: Performed by: PHARMACIST

## 2024-07-22 RX ORDER — ZINC GLUCONATE 50 MG
50 TABLET ORAL DAILY
COMMUNITY

## 2024-07-22 NOTE — PROGRESS NOTES
[facility]  [address] [place] [address]  [phone] (phone)  [phone] (fax)    Patient ID: Zuhair Flores 1945  Referring Provider: Dr. Luong     Patient's name and  were verified.    Subjective:    She presents for a follow-up diabetic visit. She has type 2 diabetes mellitus. She is compliant a majority of the time.  Assessment:     Lab Results   Component Value Date/Time    LABA1C 7.0 2024 09:27 AM    BUN 18 2024 10:23 AM    CREATININE 1.20 2024 10:23 AM     Vitals:    24 0928   BP: 136/88   Pulse: 86   Temp: (!) 96.2 °F (35.7 °C)   Weight: 80.7 kg (178 lb)     Wt Readings from Last 3 Encounters:   24 80.7 kg (178 lb)   24 81.8 kg (180 lb 6.4 oz)   23 87.1 kg (192 lb)     Ht Readings from Last 3 Encounters:   24 1.676 m (5' 6\")   23 1.676 m (5' 6\")   23 1.676 m (5' 6\")     Est, Glom Filt Rate   Date Value Ref Range Status   2022 37  Final       Diabetes Pharmacotherapy:  Trulicity 3mg weekly; Sundays  Jardiance 25mg daily  Glyburide 5mg 2 tabs in the morning;  1 tab in the evening    Glucose Trends:   Current monitoring regimen: Fingerstick blood tests - 1 times daily  Home blood sugar trends: forgot glucometer              -Fasting AM: 116, 106, Range: 90s-225   -Before lunch:   -Before dinner:   -Bedtime:  Any episodes of hypoglycemia? no    Lifestyle Factors:   Previous visit with dietician: yes - 2022  Current diet: B: Glucerna drink +/- coffee/toast            L: grab and go - taco OR beans/cornbread                       D: salad, Cracker barrel - kid's chicken tenders, carrots, grilled sourdough, unsweet tea                       Snacks: trailmix OR granola bar                        Beverages: unsweet Iced tea, water  Current exercise:  [employer] once weekly - 6 miles of bike riding and weight lifting, walking on trips    Health Maintenance:  Diabetes Management   Topic Date Due    Diabetic foot

## 2024-07-22 NOTE — PATIENT INSTRUCTIONS
Bring your logs or blood sugar meter to your next appointment   Blood Sugar Goals:  -Fasting AM: 100-150   -Before lunch/dinner: 100-170   -2 hours after eating/at bedtime: 120-200    2. Time to get scheduled for your diabetes eye exam with Dr. Littlejohn     3. Keep staying active

## 2025-01-02 ENCOUNTER — HOSPITAL ENCOUNTER (OUTPATIENT)
Dept: WOMENS IMAGING | Age: 80
Discharge: HOME | End: 2025-01-02
Payer: MEDICARE

## 2025-01-02 VITALS — HEIGHT: 66 IN | WEIGHT: 179 LBS | BODY MASS INDEX: 28.77 KG/M2

## 2025-01-02 DIAGNOSIS — Z12.31 VISIT FOR SCREENING MAMMOGRAM: ICD-10-CM

## 2025-01-02 PROCEDURE — 77063 BREAST TOMOSYNTHESIS BI: CPT

## 2025-02-11 ENCOUNTER — OFFICE VISIT (OUTPATIENT)
Dept: INTERNAL MEDICINE CLINIC | Age: 80
End: 2025-02-11

## 2025-02-11 VITALS
BODY MASS INDEX: 27.35 KG/M2 | TEMPERATURE: 98.4 F | DIASTOLIC BLOOD PRESSURE: 60 MMHG | WEIGHT: 170.2 LBS | SYSTOLIC BLOOD PRESSURE: 102 MMHG | HEART RATE: 88 BPM | HEIGHT: 66 IN

## 2025-02-11 DIAGNOSIS — E11.9 TYPE 2 DIABETES MELLITUS WITHOUT COMPLICATION, WITHOUT LONG-TERM CURRENT USE OF INSULIN (HCC): Primary | ICD-10-CM

## 2025-02-11 PROCEDURE — NBSRV NON-BILLABLE SERVICE: Performed by: REGISTERED NURSE

## 2025-02-11 NOTE — PATIENT INSTRUCTIONS
Make an appointment to get your eyes checked             ANDRESSA/ Dr. Littlejohn  Try a Sugar Latte in place of your regular Latte         See how much it raises your blood sugar        -test your blood sugar 2 hours after you eat/ drink it           Goal:  under 160/180  Stop drinking fruit juice --find another food item to spend              You money on  Keep up your exercise/ activity efforts! Great job!

## 2025-02-11 NOTE — PROGRESS NOTES
[facility]  [address] [place] [address]  [phone] (phone)  [phone] (fax)    Patient ID: Zuhair Flores 1945  Referring Provider: Dr. Luong     Patient's name and  were verified.    Subjective:    She presents for a follow-up diabetic visit. She has type 2 diabetes mellitus. She is compliant some of the time.  Assessment:     Lab Results   Component Value Date/Time    LABA1C 7.2 2024 11:11 AM    BUN 16 2024 11:11 AM    CREATININE 1.31 2024 11:11 AM     Vitals:    25 1838   BP: 102/60   Site: Left Upper Arm   Position: Sitting   Pulse: 88   Temp: 98.4 °F (36.9 °C)   Weight: 77.2 kg (170 lb 3.2 oz)   Height: 1.676 m (5' 6\")     Wt Readings from Last 3 Encounters:   25 77.2 kg (170 lb 3.2 oz)   25 81.2 kg (179 lb)   24 80.7 kg (178 lb)     Ht Readings from Last 3 Encounters:   25 1.676 m (5' 6\")   25 1.676 m (5' 6\")   24 1.676 m (5' 6\")     Est, Glogeorgi Filt Rate   Date Value Ref Range Status   2022 37  Final         Diabetes Pharmacotherapy:  Trulicity 3mg weekly; Sundays  Jardiance 25mg daily  Glyburide 5mg 2 tabs in AM and 1 tab in the evening    Glucose Trends:   Glucose at 30minpp today resulted at 399mg/dl  Current monitoring regimen: Fingerstick blood tests - 1 times daily  Home blood sugar trends:    Did not bring meter to appt today   -Fasting AM: 90's-130's; 160   -Before lunch:   -Before dinner:   -Bedtime:  Any episodes of hypoglycemia? no   -Treats with candy    Lifestyle Factors:   Previous visit with dietician: yes - 2022  Current diet: B: Boost            L: egg/ 2 duque/ fruit                               Skips 50%                                  2egg/ 2 duque/ 2 pancake-syrup                       D: chicken tenders/ carrots/ sour dough/ unsw tea                       Snacks: nuts; cheez its                       Beverages: unsw tea; water; occas coffee  [facility]: [facility]

## 2025-08-11 ENCOUNTER — OFFICE VISIT (OUTPATIENT)
Dept: INTERNAL MEDICINE CLINIC | Age: 80
End: 2025-08-11
Payer: MEDICARE

## 2025-08-11 VITALS
HEART RATE: 81 BPM | SYSTOLIC BLOOD PRESSURE: 119 MMHG | TEMPERATURE: 98.1 F | WEIGHT: 172.6 LBS | DIASTOLIC BLOOD PRESSURE: 70 MMHG | BODY MASS INDEX: 27.86 KG/M2

## 2025-08-11 DIAGNOSIS — E11.9 TYPE 2 DIABETES MELLITUS WITHOUT COMPLICATION, WITHOUT LONG-TERM CURRENT USE OF INSULIN (HCC): Primary | ICD-10-CM

## 2025-08-11 LAB — HBA1C MFR BLD: 6.7 % (ref 4.3–5.7)

## 2025-08-11 PROCEDURE — G0108 DIAB MANAGE TRN  PER INDIV: HCPCS | Performed by: PHARMACIST

## 2025-08-11 PROCEDURE — NBSRV NON-BILLABLE SERVICE: Performed by: PHARMACIST

## (undated) DEVICE — 4-PORT MANIFOLD: Brand: NEPTUNE 2

## (undated) DEVICE — SOLUTION IV IRRIG WATER 500ML POUR BRL ST 2F7113

## (undated) DEVICE — NEEDLE SCLERO 23GA L4MM CATH L240CM CNTRST SHTH DIA1.8MM

## (undated) DEVICE — CONMED SCOPE SAVER BITE BLOCK, 20X27 MM: Brand: SCOPE SAVER

## (undated) DEVICE — SOLUTION IV 1000ML 0.45% SOD CHL PH 5 INJ USP VIAFLX PLAS

## (undated) DEVICE — SET ADMIN 25ML L117IN PMP MOD CK VLV RLER CLMP 2 SMRTSITE

## (undated) DEVICE — SYRINGE 10ML FLUSH ST PREFILLED W/SALINE

## (undated) DEVICE — CATHETER ETER IV 22GA L1IN POLYUR STR RADPQ INTROCAN SFTY

## (undated) DEVICE — IV START KIT: Brand: MEDLINE INDUSTRIES, INC.

## (undated) DEVICE — TUBING IV STOPCOCK 48 CM 3 W

## (undated) DEVICE — KIT INF CTRL 2OZ LUB TBNG L12FT DBL END BRSH SYR OP4

## (undated) DEVICE — SET LNR RED GRN W/ BASE CLEANASCOPE